# Patient Record
Sex: FEMALE | Race: WHITE
[De-identification: names, ages, dates, MRNs, and addresses within clinical notes are randomized per-mention and may not be internally consistent; named-entity substitution may affect disease eponyms.]

---

## 2019-03-02 ENCOUNTER — HOSPITAL ENCOUNTER (EMERGENCY)
Dept: HOSPITAL 82 - ED | Age: 24
Discharge: HOME | DRG: 563 | End: 2019-03-02
Payer: COMMERCIAL

## 2019-03-02 VITALS — HEIGHT: 66 IN | BODY MASS INDEX: 47.09 KG/M2 | WEIGHT: 293 LBS

## 2019-03-02 VITALS — SYSTOLIC BLOOD PRESSURE: 117 MMHG | DIASTOLIC BLOOD PRESSURE: 57 MMHG
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
## 2019-03-02 DIAGNOSIS — S42.295A: Primary | ICD-10-CM
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
## 2019-07-12 ENCOUNTER — HOSPITAL ENCOUNTER (EMERGENCY)
Dept: HOSPITAL 82 - ED | Age: 24
Discharge: ANOTHER HOSPITAL | DRG: 690 | End: 2019-07-12
Payer: COMMERCIAL

## 2019-07-12 VITALS — WEIGHT: 293 LBS | BODY MASS INDEX: 47.09 KG/M2 | HEIGHT: 66 IN

## 2019-07-12 VITALS — SYSTOLIC BLOOD PRESSURE: 116 MMHG | DIASTOLIC BLOOD PRESSURE: 77 MMHG

## 2019-07-12 DIAGNOSIS — E66.01: ICD-10-CM

## 2019-07-12 DIAGNOSIS — N39.0: Primary | ICD-10-CM

## 2019-07-12 LAB
ALBUMIN SERPL-MCNC: 3.8 G/DL (ref 3.2–5)
ALP SERPL-CCNC: 124 U/L (ref 38–126)
AMYLASE SERPL-CCNC: 34 U/L (ref 30–110)
ANION GAP SERPL CALCULATED.3IONS-SCNC: 16 MMOL/L
AST SERPL-CCNC: 91 U/L (ref 14–36)
BACTERIA #/AREA URNS HPF: (no result) HPF
BARBITURATES UR-MCNC: NEGATIVE UG/ML
BASOPHILS NFR BLD AUTO: 0 % (ref 0–3)
BILIRUB UR QL STRIP.AUTO: NEGATIVE
BUN SERPL-MCNC: 9 MG/DL (ref 7–17)
BUN/CREAT SERPL: 12
CHLORIDE SERPL-SCNC: 106 MMOL/L (ref 95–108)
CO2 SERPL-SCNC: 23 MMOL/L (ref 22–30)
COCAINE UR-MCNC: NEGATIVE NG/ML
COLOR UR AUTO: YELLOW
CREAT SERPL-MCNC: 0.7 MG/DL (ref 0.5–1)
EOSINOPHIL NFR BLD AUTO: 0 % (ref 0–8)
EPI CELLS URNS QL MICRO: (no result) EPI/HPF
ERYTHROCYTE [DISTWIDTH] IN BLOOD BY AUTOMATED COUNT: 18.4 % (ref 11.5–15.5)
GLUCOSE UR STRIP.AUTO-MCNC: NEGATIVE MG/DL
HCT VFR BLD AUTO: 34 % (ref 37–47)
HGB BLD-MCNC: 10.2 G/DL (ref 12–16)
HGB UR QL STRIP.AUTO: (no result)
IMM GRANULOCYTES NFR BLD: 1 % (ref 0–5)
KETONES UR STRIP.AUTO-MCNC: NEGATIVE MG/DL
LEUKOCYTE ESTERASE UR QL STRIP.AUTO: (no result)
LIPASE SERPL-CCNC: 59 U/L (ref 23–300)
LYMPHOCYTES NFR BLD: 12 % (ref 15–41)
MCH RBC QN AUTO: 20.1 PG  CALC (ref 26–32)
MCHC RBC AUTO-ENTMCNC: 30 G/L CALC (ref 32–36)
MCV RBC AUTO: 66.9 FL  CALC (ref 80–100)
METHADONE SERPL-MCNC: NEGATIVE NG/ML
MONOCYTES NFR BLD AUTO: 9 % (ref 2–13)
NEUTROPHILS # BLD AUTO: 14.76 THOU/UL (ref 2–7.15)
NEUTROPHILS NFR BLD AUTO: 78 % (ref 42–76)
NITRITE UR QL STRIP.AUTO: POSITIVE
OXCYCODONE: NEGATIVE
PH UR STRIP.AUTO: 5.5 [PH] (ref 4.5–8)
PLATELET # BLD AUTO: 393 THOU/UL (ref 130–400)
POTASSIUM SERPL-SCNC: 4.2 MMOL/L (ref 3.5–5.1)
PROT SERPL-MCNC: 8 G/DL (ref 6.3–8.2)
PROT UR QL STRIP.AUTO: 30 MG/DL
RBC # BLD AUTO: 5.08 MILL/UL (ref 4.2–5.6)
RBC #/AREA URNS HPF: (no result) RBC/HPF (ref 0–5)
SERVICE CMNT 15-IMP: (no result) HPF
SODIUM SERPL-SCNC: 141 MMOL/L (ref 137–146)
SP GR UR STRIP.AUTO: 1.02
TETRAHYDROCANNABIONOL: NEGATIVE
TRICYLIC ANTIDEPRESSANTS: NEGATIVE
UROBILINOGEN UR QL STRIP.AUTO: 0.2 E.U./DL
WBC #/AREA URNS HPF: (no result) WBC/HPF (ref 0–5)

## 2020-01-22 ENCOUNTER — HOSPITAL ENCOUNTER (EMERGENCY)
Age: 25
Discharge: ANOTHER HOSPITAL | DRG: 392 | End: 2020-01-22
Payer: COMMERCIAL

## 2020-01-22 DIAGNOSIS — R10.31: Primary | ICD-10-CM

## 2020-01-22 DIAGNOSIS — Z87.440: ICD-10-CM

## 2020-01-22 DIAGNOSIS — E66.9: ICD-10-CM

## 2020-01-22 DIAGNOSIS — F17.210: ICD-10-CM

## 2020-01-22 LAB
ALBUMIN SERPL-MCNC: 4.4 G/DL (ref 3.2–5)
ALP SERPL-CCNC: 69 U/L (ref 38–126)
AMYLASE SERPL-CCNC: 33 U/L (ref 30–110)
ANION GAP SERPL CALCULATED.3IONS-SCNC: 18 MMOL/L
AST SERPL-CCNC: 30 U/L (ref 14–36)
BASOPHILS NFR BLD AUTO: 0 % (ref 0–3)
BILIRUB UR QL STRIP.AUTO: NEGATIVE
BUN SERPL-MCNC: 14 MG/DL (ref 7–17)
BUN/CREAT SERPL: 17
CHLORIDE SERPL-SCNC: 105 MMOL/L (ref 95–108)
CO2 SERPL-SCNC: 21 MMOL/L (ref 22–30)
COLOR UR AUTO: YELLOW
CREAT SERPL-MCNC: 0.9 MG/DL (ref 0.5–1)
EOSINOPHIL NFR BLD AUTO: 1 % (ref 0–8)
ERYTHROCYTE [DISTWIDTH] IN BLOOD BY AUTOMATED COUNT: 15.4 % (ref 11.5–15.5)
GLUCOSE UR STRIP.AUTO-MCNC: NEGATIVE MG/DL
HCT VFR BLD AUTO: 38.7 % (ref 37–47)
HGB BLD-MCNC: 11.7 G/DL (ref 12–16)
HGB UR QL STRIP.AUTO: (no result)
IMM GRANULOCYTES NFR BLD: 0.6 % (ref 0–5)
KETONES UR STRIP.AUTO-MCNC: NEGATIVE MG/DL
LEUKOCYTE ESTERASE UR QL STRIP.AUTO: (no result)
LIPASE SERPL-CCNC: 82 U/L (ref 23–300)
LYMPHOCYTES NFR BLD: 16 % (ref 15–41)
MCH RBC QN AUTO: 22.6 PG  CALC (ref 26–32)
MCHC RBC AUTO-ENTMCNC: 30.2 G/L CALC (ref 32–36)
MCV RBC AUTO: 74.7 FL  CALC (ref 80–100)
MONOCYTES NFR BLD AUTO: 6 % (ref 2–13)
NEUTROPHILS # BLD AUTO: 8.18 THOU/UL (ref 2–7.15)
NEUTROPHILS NFR BLD AUTO: 77 % (ref 42–76)
NITRITE UR QL STRIP.AUTO: NEGATIVE
PH UR STRIP.AUTO: 5 [PH] (ref 4.5–8)
PLATELET # BLD AUTO: 321 THOU/UL (ref 130–400)
POTASSIUM SERPL-SCNC: 4.1 MMOL/L (ref 3.5–5.1)
PROT SERPL-MCNC: 8.7 G/DL (ref 6.3–8.2)
PROT UR QL STRIP.AUTO: NEGATIVE MG/DL
RBC # BLD AUTO: 5.18 MILL/UL (ref 4.2–5.6)
SODIUM SERPL-SCNC: 140 MMOL/L (ref 137–146)
SP GR UR STRIP.AUTO: 1.02
UROBILINOGEN UR QL STRIP.AUTO: 0.2 E.U./DL

## 2020-07-23 ENCOUNTER — HOSPITAL ENCOUNTER (EMERGENCY)
Dept: HOSPITAL 82 - ED | Age: 25
Discharge: HOME | DRG: 313 | End: 2020-07-23
Payer: COMMERCIAL

## 2020-07-23 VITALS — DIASTOLIC BLOOD PRESSURE: 71 MMHG | SYSTOLIC BLOOD PRESSURE: 125 MMHG

## 2020-07-23 VITALS — WEIGHT: 293 LBS | BODY MASS INDEX: 45.99 KG/M2 | HEIGHT: 67 IN

## 2020-07-23 DIAGNOSIS — R07.89: Primary | ICD-10-CM

## 2020-07-23 DIAGNOSIS — F15.90: ICD-10-CM

## 2020-07-23 DIAGNOSIS — F41.9: ICD-10-CM

## 2020-07-23 DIAGNOSIS — E66.01: ICD-10-CM

## 2020-07-23 DIAGNOSIS — F17.200: ICD-10-CM

## 2020-07-23 LAB
ALBUMIN SERPL-MCNC: 4.3 G/DL (ref 3.2–5)
ALP SERPL-CCNC: 87 U/L (ref 38–126)
AMYLASE SERPL-CCNC: 69 U/L (ref 30–110)
ANION GAP SERPL CALCULATED.3IONS-SCNC: 11 MMOL/L
AST SERPL-CCNC: 120 U/L (ref 14–36)
BACTERIA #/AREA URNS HPF: (no result) HPF
BASOPHILS NFR BLD AUTO: 0 % (ref 0–3)
BILIRUB UR QL STRIP.AUTO: NEGATIVE
BUN SERPL-MCNC: 17 MG/DL (ref 7–17)
BUN/CREAT SERPL: 18
CHLORIDE SERPL-SCNC: 105 MMOL/L (ref 95–108)
CO2 SERPL-SCNC: 23 MMOL/L (ref 22–30)
COLOR UR AUTO: (no result)
CREAT SERPL-MCNC: 0.9 MG/DL (ref 0.5–1)
EOSINOPHIL NFR BLD AUTO: 1 % (ref 0–8)
EPI CELLS URNS QL MICRO: (no result) EPI/HPF
ERYTHROCYTE [DISTWIDTH] IN BLOOD BY AUTOMATED COUNT: 16.7 % (ref 11.5–15.5)
GLUCOSE UR STRIP.AUTO-MCNC: NEGATIVE MG/DL
HCT VFR BLD AUTO: 36.4 % (ref 37–47)
HGB BLD-MCNC: 11.1 G/DL (ref 12–16)
HGB UR QL STRIP.AUTO: (no result)
IMM GRANULOCYTES NFR BLD: 0.5 % (ref 0–5)
KETONES UR STRIP.AUTO-MCNC: (no result) MG/DL
LEUKOCYTE ESTERASE UR QL STRIP.AUTO: NEGATIVE
LIPASE SERPL-CCNC: 173 U/L (ref 23–300)
LYMPHOCYTES NFR BLD: 26 % (ref 15–41)
MCH RBC QN AUTO: 22.4 PG  CALC (ref 26–32)
MCHC RBC AUTO-ENTMCNC: 30.5 G/DL CAL (ref 32–36)
MCV RBC AUTO: 73.5 FL  CALC (ref 80–100)
MONOCYTES NFR BLD AUTO: 7 % (ref 2–13)
NEUTROPHILS # BLD AUTO: 5.56 THOU/UL (ref 2–7.15)
NEUTROPHILS NFR BLD AUTO: 64 % (ref 42–76)
NITRITE UR QL STRIP.AUTO: NEGATIVE
PH UR STRIP.AUTO: 5.5 [PH] (ref 4.5–8)
PLATELET # BLD AUTO: 280 THOU/UL (ref 130–400)
POTASSIUM SERPL-SCNC: 3.8 MMOL/L (ref 3.5–5.1)
PROT SERPL-MCNC: 8.3 G/DL (ref 6.3–8.2)
PROT UR QL STRIP.AUTO: 30 MG/DL
RBC # BLD AUTO: 4.95 MILL/UL (ref 4.2–5.6)
RBC #/AREA URNS HPF: >100 RBC/HPF (ref 0–5)
SODIUM SERPL-SCNC: 136 MMOL/L (ref 137–146)
SP GR UR STRIP.AUTO: >=1.03
SQUAMOUS URNS QL MICRO: (no result) EPI/HPF
UROBILINOGEN UR QL STRIP.AUTO: 0.2 E.U./DL
WBC #/AREA URNS HPF: (no result) WBC/HPF (ref 0–5)

## 2020-08-13 ENCOUNTER — HOSPITAL ENCOUNTER (EMERGENCY)
Dept: HOSPITAL 82 - ED | Age: 25
Discharge: HOME | DRG: 313 | End: 2020-08-13
Payer: COMMERCIAL

## 2020-08-13 VITALS — BODY MASS INDEX: 45.99 KG/M2 | HEIGHT: 67 IN | WEIGHT: 293 LBS

## 2020-08-13 VITALS — SYSTOLIC BLOOD PRESSURE: 118 MMHG | DIASTOLIC BLOOD PRESSURE: 58 MMHG

## 2020-08-13 DIAGNOSIS — F84.0: ICD-10-CM

## 2020-08-13 DIAGNOSIS — F41.9: ICD-10-CM

## 2020-08-13 DIAGNOSIS — R07.89: Primary | ICD-10-CM

## 2020-08-13 DIAGNOSIS — G47.30: ICD-10-CM

## 2020-08-13 DIAGNOSIS — F17.210: ICD-10-CM

## 2020-08-13 DIAGNOSIS — E66.9: ICD-10-CM

## 2020-08-13 LAB
ALBUMIN SERPL-MCNC: 4.4 G/DL (ref 3.2–5)
ALP SERPL-CCNC: 94 U/L (ref 38–126)
AMYLASE SERPL-CCNC: 59 U/L (ref 30–110)
ANION GAP SERPL CALCULATED.3IONS-SCNC: 12 MMOL/L
AST SERPL-CCNC: 67 U/L (ref 14–36)
BASOPHILS NFR BLD AUTO: 0 % (ref 0–3)
BUN SERPL-MCNC: 14 MG/DL (ref 7–17)
BUN/CREAT SERPL: 17
CHLORIDE SERPL-SCNC: 103 MMOL/L (ref 95–108)
CO2 SERPL-SCNC: 27 MMOL/L (ref 22–30)
CREAT SERPL-MCNC: 0.9 MG/DL (ref 0.5–1)
EOSINOPHIL NFR BLD AUTO: 1 % (ref 0–8)
ERYTHROCYTE [DISTWIDTH] IN BLOOD BY AUTOMATED COUNT: 17.5 % (ref 11.5–15.5)
HCT VFR BLD AUTO: 41 % (ref 37–47)
HGB BLD-MCNC: 12 G/DL (ref 12–16)
IMM GRANULOCYTES NFR BLD: 0.6 % (ref 0–5)
LIPASE SERPL-CCNC: 154 U/L (ref 23–300)
LYMPHOCYTES NFR BLD: 23 % (ref 15–41)
MCH RBC QN AUTO: 22.2 PG  CALC (ref 26–32)
MCHC RBC AUTO-ENTMCNC: 29.3 G/DL CAL (ref 32–36)
MCV RBC AUTO: 75.9 FL  CALC (ref 80–100)
MONOCYTES NFR BLD AUTO: 6 % (ref 2–13)
MYOGLOBIN SERPL-MCNC: 19 NG/ML (ref 0–62)
NEUTROPHILS # BLD AUTO: 6.17 THOU/UL (ref 2–7.15)
NEUTROPHILS NFR BLD AUTO: 69 % (ref 42–76)
PLATELET # BLD AUTO: 314 THOU/UL (ref 130–400)
POTASSIUM SERPL-SCNC: 3.8 MMOL/L (ref 3.5–5.1)
PROT SERPL-MCNC: 8.3 G/DL (ref 6.3–8.2)
RBC # BLD AUTO: 5.4 MILL/UL (ref 4.2–5.6)
SODIUM SERPL-SCNC: 138 MMOL/L (ref 137–146)

## 2020-09-26 ENCOUNTER — HOSPITAL ENCOUNTER (EMERGENCY)
Dept: HOSPITAL 82 - ED | Age: 25
LOS: 1 days | Discharge: HOME | DRG: 103 | End: 2020-09-27
Payer: COMMERCIAL

## 2020-09-26 VITALS — WEIGHT: 293 LBS | HEIGHT: 67 IN | BODY MASS INDEX: 45.99 KG/M2

## 2020-09-26 DIAGNOSIS — M79.671: ICD-10-CM

## 2020-09-26 DIAGNOSIS — F17.200: ICD-10-CM

## 2020-09-26 DIAGNOSIS — R51: Primary | ICD-10-CM

## 2020-09-26 DIAGNOSIS — F84.0: ICD-10-CM

## 2020-09-26 DIAGNOSIS — E66.9: ICD-10-CM

## 2020-09-27 VITALS — SYSTOLIC BLOOD PRESSURE: 134 MMHG | DIASTOLIC BLOOD PRESSURE: 68 MMHG

## 2020-09-27 LAB
ALBUMIN SERPL-MCNC: 4.4 G/DL (ref 3.2–5)
ALP SERPL-CCNC: 72 U/L (ref 38–126)
ANION GAP SERPL CALCULATED.3IONS-SCNC: 14 MMOL/L
AST SERPL-CCNC: 29 U/L (ref 14–36)
BASOPHILS NFR BLD AUTO: 0 % (ref 0–3)
BUN SERPL-MCNC: 16 MG/DL (ref 7–17)
BUN/CREAT SERPL: 23
CHLORIDE SERPL-SCNC: 107 MMOL/L (ref 95–108)
CO2 SERPL-SCNC: 22 MMOL/L (ref 22–30)
CREAT SERPL-MCNC: 0.7 MG/DL (ref 0.5–1)
EOSINOPHIL NFR BLD AUTO: 1 % (ref 0–8)
ERYTHROCYTE [DISTWIDTH] IN BLOOD BY AUTOMATED COUNT: 15.8 % (ref 11.5–15.5)
HCT VFR BLD AUTO: 35.9 % (ref 37–47)
HGB BLD-MCNC: 11.4 G/DL (ref 12–16)
IMM GRANULOCYTES NFR BLD: 0.7 % (ref 0–5)
LYMPHOCYTES NFR BLD: 32 % (ref 15–41)
MCH RBC QN AUTO: 23.6 PG  CALC (ref 26–32)
MCHC RBC AUTO-ENTMCNC: 31.8 G/DL CAL (ref 32–36)
MCV RBC AUTO: 74.2 FL  CALC (ref 80–100)
MONOCYTES NFR BLD AUTO: 7 % (ref 2–13)
NEUTROPHILS # BLD AUTO: 5.52 THOU/UL (ref 2–7.15)
NEUTROPHILS NFR BLD AUTO: 59 % (ref 42–76)
PLATELET # BLD AUTO: 322 THOU/UL (ref 130–400)
POTASSIUM SERPL-SCNC: 3.5 MMOL/L (ref 3.5–5.1)
PROT SERPL-MCNC: 8.3 G/DL (ref 6.3–8.2)
RBC # BLD AUTO: 4.84 MILL/UL (ref 4.2–5.6)
SODIUM SERPL-SCNC: 140 MMOL/L (ref 137–146)

## 2020-11-16 ENCOUNTER — HOSPITAL ENCOUNTER (EMERGENCY)
Dept: HOSPITAL 82 - ED | Age: 25
Discharge: HOME | DRG: 880 | End: 2020-11-16
Payer: COMMERCIAL

## 2020-11-16 VITALS — BODY MASS INDEX: 45.99 KG/M2 | WEIGHT: 293 LBS | HEIGHT: 67 IN

## 2020-11-16 VITALS — SYSTOLIC BLOOD PRESSURE: 126 MMHG | DIASTOLIC BLOOD PRESSURE: 83 MMHG

## 2020-11-16 DIAGNOSIS — F41.0: Primary | ICD-10-CM

## 2020-11-16 DIAGNOSIS — E66.9: ICD-10-CM

## 2020-11-16 DIAGNOSIS — N39.0: ICD-10-CM

## 2020-11-16 DIAGNOSIS — F31.9: ICD-10-CM

## 2020-11-16 DIAGNOSIS — Z87.440: ICD-10-CM

## 2020-11-16 LAB
ALBUMIN SERPL-MCNC: 4.4 G/DL (ref 3.2–5)
ALP SERPL-CCNC: 72 U/L (ref 38–126)
AMORPH SED URNS QL MICRO: (no result) HPF
ANION GAP SERPL CALCULATED.3IONS-SCNC: 14 MMOL/L
AST SERPL-CCNC: 35 U/L (ref 14–36)
BASOPHILS NFR BLD AUTO: 0 % (ref 0–3)
BILIRUB UR QL STRIP.AUTO: NEGATIVE
BUN SERPL-MCNC: 18 MG/DL (ref 7–17)
BUN/CREAT SERPL: 21
CAOX CRY URNS QL MICRO: (no result) LPF
CHLORIDE SERPL-SCNC: 112 MMOL/L (ref 95–108)
CLARITY UR: (no result)
CO2 SERPL-SCNC: 19 MMOL/L (ref 22–30)
COLOR UR AUTO: YELLOW
CREAT SERPL-MCNC: 0.8 MG/DL (ref 0.5–1)
EOSINOPHIL NFR BLD AUTO: 1 % (ref 0–8)
ERYTHROCYTE [DISTWIDTH] IN BLOOD BY AUTOMATED COUNT: 16.1 % (ref 11.5–15.5)
GLUCOSE UR STRIP.AUTO-MCNC: NEGATIVE MG/DL
HCT VFR BLD AUTO: 43.3 % (ref 37–47)
HGB BLD-MCNC: 12.7 G/DL (ref 12–16)
HGB UR QL STRIP.AUTO: (no result)
IMM GRANULOCYTES NFR BLD: 0.5 % (ref 0–5)
KETONES UR STRIP.AUTO-MCNC: NEGATIVE MG/DL
LEUKOCYTE ESTERASE UR QL STRIP.AUTO: (no result)
LIPASE SERPL-CCNC: 107 U/L (ref 23–300)
LYMPHOCYTES NFR BLD: 28 % (ref 15–41)
MCH RBC QN AUTO: 24.2 PG  CALC (ref 26–32)
MCHC RBC AUTO-ENTMCNC: 29.3 G/DL CAL (ref 32–36)
MCV RBC AUTO: 82.5 FL  CALC (ref 80–100)
MONOCYTES NFR BLD AUTO: 7 % (ref 2–13)
NEUTROPHILS # BLD AUTO: 5.92 THOU/UL (ref 2–7.15)
NEUTROPHILS NFR BLD AUTO: 63 % (ref 42–76)
NITRITE UR QL STRIP.AUTO: NEGATIVE
PH UR STRIP.AUTO: 5.5 [PH] (ref 4.5–8)
PLATELET # BLD AUTO: 286 THOU/UL (ref 130–400)
POTASSIUM SERPL-SCNC: 4 MMOL/L (ref 3.5–5.1)
PROT SERPL-MCNC: 8.6 G/DL (ref 6.3–8.2)
PROT UR STRIP.AUTO-MCNC: NEGATIVE MG/DL
RBC # BLD AUTO: 5.25 MILL/UL (ref 4.2–5.6)
RBC #/AREA URNS HPF: (no result) RBC/HPF (ref 0–5)
SODIUM SERPL-SCNC: 140 MMOL/L (ref 137–146)
SP GR UR STRIP.AUTO: >=1.03
SQUAMOUS URNS QL MICRO: (no result) EPI/HPF
UROBILINOGEN UR QL STRIP.AUTO: 0.2 E.U./DL
WBC #/AREA URNS HPF: (no result) WBC/HPF (ref 0–5)

## 2020-12-29 ENCOUNTER — HOSPITAL ENCOUNTER (INPATIENT)
Dept: HOSPITAL 82 - ED | Age: 25
LOS: 2 days | Discharge: HOME | DRG: 918 | End: 2020-12-31
Attending: INTERNAL MEDICINE | Admitting: INTERNAL MEDICINE
Payer: COMMERCIAL

## 2020-12-29 VITALS — DIASTOLIC BLOOD PRESSURE: 76 MMHG | SYSTOLIC BLOOD PRESSURE: 149 MMHG

## 2020-12-29 VITALS — SYSTOLIC BLOOD PRESSURE: 160 MMHG | DIASTOLIC BLOOD PRESSURE: 82 MMHG

## 2020-12-29 VITALS — DIASTOLIC BLOOD PRESSURE: 73 MMHG | SYSTOLIC BLOOD PRESSURE: 164 MMHG

## 2020-12-29 VITALS — DIASTOLIC BLOOD PRESSURE: 71 MMHG | SYSTOLIC BLOOD PRESSURE: 155 MMHG

## 2020-12-29 VITALS — SYSTOLIC BLOOD PRESSURE: 134 MMHG | DIASTOLIC BLOOD PRESSURE: 64 MMHG

## 2020-12-29 VITALS — DIASTOLIC BLOOD PRESSURE: 59 MMHG | SYSTOLIC BLOOD PRESSURE: 139 MMHG

## 2020-12-29 VITALS — SYSTOLIC BLOOD PRESSURE: 148 MMHG | DIASTOLIC BLOOD PRESSURE: 55 MMHG

## 2020-12-29 VITALS — DIASTOLIC BLOOD PRESSURE: 86 MMHG | SYSTOLIC BLOOD PRESSURE: 159 MMHG

## 2020-12-29 VITALS — DIASTOLIC BLOOD PRESSURE: 91 MMHG | SYSTOLIC BLOOD PRESSURE: 149 MMHG

## 2020-12-29 VITALS — HEIGHT: 67 IN | WEIGHT: 293 LBS | BODY MASS INDEX: 45.99 KG/M2

## 2020-12-29 DIAGNOSIS — E66.01: ICD-10-CM

## 2020-12-29 DIAGNOSIS — F17.200: ICD-10-CM

## 2020-12-29 DIAGNOSIS — G47.30: ICD-10-CM

## 2020-12-29 DIAGNOSIS — F15.129: ICD-10-CM

## 2020-12-29 DIAGNOSIS — T43.621A: Primary | ICD-10-CM

## 2020-12-29 DIAGNOSIS — R00.0: ICD-10-CM

## 2020-12-29 DIAGNOSIS — F31.9: ICD-10-CM

## 2020-12-29 DIAGNOSIS — Z20.828: ICD-10-CM

## 2020-12-29 DIAGNOSIS — Z78.1: ICD-10-CM

## 2020-12-29 DIAGNOSIS — R44.1: ICD-10-CM

## 2020-12-29 DIAGNOSIS — F84.0: ICD-10-CM

## 2020-12-29 DIAGNOSIS — N30.20: ICD-10-CM

## 2020-12-29 DIAGNOSIS — Z87.442: ICD-10-CM

## 2020-12-29 DIAGNOSIS — Z23: ICD-10-CM

## 2020-12-29 DIAGNOSIS — E86.0: ICD-10-CM

## 2020-12-29 DIAGNOSIS — R44.0: ICD-10-CM

## 2020-12-29 DIAGNOSIS — I95.9: ICD-10-CM

## 2020-12-29 DIAGNOSIS — F41.9: ICD-10-CM

## 2020-12-29 LAB
ALBUMIN SERPL-MCNC: 4.8 G/DL (ref 3.2–5)
ALP SERPL-CCNC: 78 U/L (ref 38–126)
ANION GAP SERPL CALCULATED.3IONS-SCNC: 17 MMOL/L
APTT PPP: 23.3 SECONDS (ref 20–32.5)
AST SERPL-CCNC: 24 U/L (ref 14–36)
BACTERIA #/AREA URNS HPF: (no result) HPF
BASOPHILS NFR BLD AUTO: 0 % (ref 0–3)
BILIRUB UR QL STRIP.AUTO: (no result)
BUN SERPL-MCNC: 19 MG/DL (ref 7–17)
BUN/CREAT SERPL: 23
CHLORIDE SERPL-SCNC: 106 MMOL/L (ref 95–108)
CO2 SERPL-SCNC: 24 MMOL/L (ref 22–30)
COLOR UR AUTO: YELLOW
CREAT SERPL-MCNC: 0.9 MG/DL (ref 0.5–1)
EOSINOPHIL NFR BLD AUTO: 1 % (ref 0–8)
EPI CELLS URNS QL MICRO: (no result) EPI/HPF
ERYTHROCYTE [DISTWIDTH] IN BLOOD BY AUTOMATED COUNT: 14.8 % (ref 11.5–15.5)
ETHANOL SERPL-MCNC: 0 MG/DL (ref 0–30)
FINE GRAN CASTS URNS QL MICRO: (no result) LPF
GLUCOSE UR STRIP.AUTO-MCNC: NEGATIVE MG/DL
HCT VFR BLD AUTO: 41.6 % (ref 37–47)
HGB BLD-MCNC: 12.8 G/DL (ref 12–16)
HGB UR QL STRIP.AUTO: (no result)
IMM GRANULOCYTES NFR BLD: 1.1 % (ref 0–5)
INR PPP: 1.1 RATIO (ref 0.7–1.3)
KETONES UR STRIP.AUTO-MCNC: 15 MG/DL
LEUKOCYTE ESTERASE UR QL STRIP.AUTO: NEGATIVE
LIPASE SERPL-CCNC: 95 U/L (ref 23–300)
LYMPHOCYTES NFR BLD: 26 % (ref 15–41)
MCH RBC QN AUTO: 23.3 PG  CALC (ref 26–32)
MCHC RBC AUTO-ENTMCNC: 30.8 G/DL CAL (ref 32–36)
MCV RBC AUTO: 75.8 FL  CALC (ref 80–100)
MONOCYTES NFR BLD AUTO: 8 % (ref 2–13)
NEUTROPHILS # BLD AUTO: 8.62 THOU/UL (ref 2–7.15)
NEUTROPHILS NFR BLD AUTO: 63 % (ref 42–76)
NITRITE UR QL STRIP.AUTO: NEGATIVE
PH UR STRIP.AUTO: 5.5 [PH] (ref 4.5–8)
PLATELET # BLD AUTO: 384 THOU/UL (ref 130–400)
POTASSIUM SERPL-SCNC: 3.5 MMOL/L (ref 3.5–5.1)
PROT SERPL-MCNC: 9 G/DL (ref 6.3–8.2)
PROT UR QL STRIP.AUTO: 30 MG/DL
PROTHROMBIN TIME: 11.3 SECONDS (ref 9–12.5)
RBC # BLD AUTO: 5.49 MILL/UL (ref 4.2–5.6)
SERVICE CMNT 15-IMP: (no result) HPF
SODIUM SERPL-SCNC: 143 MMOL/L (ref 137–146)
SP GR UR STRIP.AUTO: >=1.03
UROBILINOGEN UR QL STRIP.AUTO: 0.2 E.U./DL
WBC #/AREA URNS HPF: (no result) WBC/HPF (ref 0–5)

## 2020-12-29 PROCEDURE — S0166 INJ OLANZAPINE 2.5MG: HCPCS

## 2020-12-29 NOTE — NUR
25 yr old female admitted icu5 per stretcher from er. amb self & 1 assist to
bed. bed weight obtained. oriented to room. ivf began as ordered.

## 2020-12-29 NOTE — NUR
Admission Note
 
Report Given to:         MAYA LAM
Transported by:           Wheelchair          X Stretcher
 
Transported with:        X Nurse     Transporter   X Patent IV    O2
                         X Cardiac Monitor
 
Location:                X ICU       MS2

## 2020-12-29 NOTE — NUR
PT IN ROOM TALKING TO HERSELF AND LAUGHING. NO ACTIVE DISTRESS NOTED.
TACHYCARDIC ON MONITOR. CALL LIGHT IN REACH. BED ALARM IN PLACE. CONTINUE TO
MONITOR.

## 2020-12-29 NOTE — NUR
PT SLEEPING IN BED, O2 VIA NC IN PLACE. NO DISTRESS NOTED. PT SINUS TACH ON
MONITOR 108. CONTINUE TO MONITOR

## 2020-12-29 NOTE — NUR
PT LAYING IN BED. ANXIOUS AT THIS TIME. PT ADMITS THE USE OF METH X1 USE,
STATES THIS IS HER FIRST TIME USING THE SUBTANCE. ASKED PT IF SHE WAS HEARING
VOICES OR HAVING HALLUCINATIONS, PER PT " I HAVE ANXIETY AND BIPOLAR I HEAR
THEM ALL THE TIME".  A&O TO SELF, REORIENTED PT TO PLACE AND TIME,
REINFORCEMENT NEEDED. PT DENIES ANY PAIN AT THIS TIME. CONTINUSOULY GRABBING
ABD. PT ASSISTED TO BSC, PT VOIDED 200 ML OF DARK BROWN URINE. PT REPORTS TO
BE ON HER "PERIOD". FEMININE PAD PROVIDED ALONG WITH [facility] WITH THE
ASSISTANCE OF MARYANNE TREJO. BM NOTED. REDDNESS AND IRRITATION/SCRATCHES NOTED
TO VAGINAL AREA. PT ASKED AND RESPONSE WAS THAT SHE "SHAVED AND USED MCKENZIE". NO
ACTIVE DRAINAGE NOTED.  DRYNESS AROUND LIPS NOTED. PT CURRENTLY SINUS TACH ON
MONITOR 110, O2 97%, RR 20, /58. #20 TO LAC HEALTHY AND INTACT WITH NS
INFUSING  ML/HR. BED ALARM PLACED FOR SAFETY. BED SET IN LOWEST
POSITION. SIDE RAILS PADDED FOR SEIZURE PERCAUTIONS. ASSESSMENT COMPLETED.
DISCUSSED POC, REINFORCEMENT NEEDED. CALL LIGHT IN REACH. CONTINUE TO MONITOR.

## 2020-12-29 NOTE — NUR
PT HALLUCINATING TALKING TO "". STATES SHE SEES "MULTIPLE PEOPLE OUT
BY 3 THE WINDOW", THAT ARE NOW "HANGING OFF THE TREE". PT UNSTABLE AND WEAK.
SECURITY AT BEDSIDE.

## 2020-12-29 NOTE — NUR
PT HALLUCINATING AND WANTING TO "LEAVE". PT BEGINNING TO BECOME AGRESSIVE
TOWARDS THIS WRITER AND MARYANNE RN. PT UP OUT OF BED ATTEMPTING TO GO INTO
BED 4'S ROOM. ATTEMPTED TO CALM PT DOWN, UNSUCCESSFUL.

## 2020-12-29 NOTE — NUR
PT PULLING ESSENTIAL EQUIPMENT AND COMBATIVE. PT COMBATIVE. ORDER REC FOR
BILATERAL SOFT WRIST RESTRAINTS BY ELLIS SAXENA.

## 2020-12-29 NOTE — NUR
eyes closed. nad. hob remains elevated. cardiac monitor shows sinus tach hr
104. #24 rt hand. ns infusing @ 125cchr. bilat wrist restraints & fall
precautions cont.

## 2020-12-29 NOTE — NUR
PT RELEASED HERSELF FROM RESTRAINTS, STOOD UP AND URINATED ALL OVER THE FLOOR
AND ON THIS WRITER.PT REDIRECTED TO BED, [facility] PROVIDED BY THIS WRITER
AND HILDA YU RN, MONITOR PLACED BACK ON PATIENT. SOFT BILATERAL RESTRAINTS
PLACED BACK ON. BED ALARM IN PLACE, CONTINUE TO MONITOR.

## 2020-12-30 VITALS — DIASTOLIC BLOOD PRESSURE: 74 MMHG | SYSTOLIC BLOOD PRESSURE: 129 MMHG

## 2020-12-30 VITALS — SYSTOLIC BLOOD PRESSURE: 154 MMHG | DIASTOLIC BLOOD PRESSURE: 84 MMHG

## 2020-12-30 VITALS — SYSTOLIC BLOOD PRESSURE: 155 MMHG | DIASTOLIC BLOOD PRESSURE: 70 MMHG

## 2020-12-30 VITALS — DIASTOLIC BLOOD PRESSURE: 87 MMHG | SYSTOLIC BLOOD PRESSURE: 128 MMHG

## 2020-12-30 VITALS — SYSTOLIC BLOOD PRESSURE: 163 MMHG | DIASTOLIC BLOOD PRESSURE: 76 MMHG

## 2020-12-30 VITALS — SYSTOLIC BLOOD PRESSURE: 132 MMHG | DIASTOLIC BLOOD PRESSURE: 68 MMHG

## 2020-12-30 VITALS — SYSTOLIC BLOOD PRESSURE: 137 MMHG | DIASTOLIC BLOOD PRESSURE: 76 MMHG

## 2020-12-30 VITALS — SYSTOLIC BLOOD PRESSURE: 118 MMHG | DIASTOLIC BLOOD PRESSURE: 63 MMHG

## 2020-12-30 VITALS — SYSTOLIC BLOOD PRESSURE: 166 MMHG | DIASTOLIC BLOOD PRESSURE: 71 MMHG

## 2020-12-30 VITALS — DIASTOLIC BLOOD PRESSURE: 49 MMHG | SYSTOLIC BLOOD PRESSURE: 117 MMHG

## 2020-12-30 LAB
ANION GAP SERPL CALCULATED.3IONS-SCNC: 15 MMOL/L
BUN SERPL-MCNC: 9 MG/DL (ref 7–17)
BUN/CREAT SERPL: 13
CHLORIDE SERPL-SCNC: 111 MMOL/L (ref 95–108)
CK SERPL-CCNC: 129 U/L (ref 30–165)
CO2 SERPL-SCNC: 21 MMOL/L (ref 22–30)
CREAT SERPL-MCNC: 0.7 MG/DL (ref 0.5–1)
ERYTHROCYTE [DISTWIDTH] IN BLOOD BY AUTOMATED COUNT: 14.7 % (ref 11.5–15.5)
HCT VFR BLD AUTO: 39.3 % (ref 37–47)
HGB BLD-MCNC: 12.1 G/DL (ref 12–16)
MAGNESIUM SERPL-MCNC: 1.9 MG/DL (ref 1.6–2.3)
MCH RBC QN AUTO: 23.8 PG  CALC (ref 26–32)
MCHC RBC AUTO-ENTMCNC: 30.8 G/DL CAL (ref 32–36)
MCV RBC AUTO: 77.2 FL  CALC (ref 80–100)
PLATELET # BLD AUTO: 313 THOU/UL (ref 130–400)
POTASSIUM SERPL-SCNC: 4.1 MMOL/L (ref 3.5–5.1)
RBC # BLD AUTO: 5.09 MILL/UL (ref 4.2–5.6)
SODIUM SERPL-SCNC: 143 MMOL/L (ref 137–146)

## 2020-12-30 NOTE — NUR
PT WITH SOME STOOL INCONTINENCE, SAYS THAT WHEN SHE DOES METH SHE DOES NOT EAT
AND WHEN SHE STARTS EATING AGAIN SHE HAS WATERY STOOLS.

## 2020-12-30 NOTE — NUR
PT HAS BEEN AMBULATORY IN ROOM TO BR, SEEMS TO HAVE LIQUID STOOLS WITH
RESIDUAL EVIDENCE ON GOWNS, LINEN, TOWELS.  PT IS MORE COMMUNICATIVE THAN
EARLIER IN SHIFT, ASKED ABOUT WHEN SHE WOULD GO HOME.

## 2020-12-30 NOTE — NUR
PT. SLEEPING AND AWAKENED FOR ASSESSMENT AND VS; COMPLETED AT THIS TIME.
DENIES NEEDS/PAIN. PT.DECLINES DINNER TRAY AND GATORADE PROVIDED AS PE
REQUEST. PT. RE-CONNECTED TO CARDIAC MONITOR AND VS MACHINE, ENCOURAGED PT. TO
LEAVE THIS ON AS SHE HAS REFUSED THROUGHOUT THE DAY. PT. UP TO THE CHAIR AND
LINENS CHANGED AS PER HER REQUEST. CARDIAC MONITOR READS STACH 110'S. B/P WNL.
SIDE RAILS PADDED. BSC PLACED AT BEDSIDE AND INSTRUCTED TO USE; ENCOURAGED TO
CALL FOR ANY NEEDS. CALL LIGHT IS IN REACH. WILL CONTINUE TO MONITOR.

## 2020-12-30 NOTE — NUR
PT ABLE TO BATHE HERSELF THIS MORNING, SHEETS CHANGED.  PT HEARD TALKING ON
PHONE WHILE IN ROOM.  SHE REMAINS IMPULSIVE, GETS UP QUICKLY TO BSC.  NO
DISTRESS, NO COMPLAINTS.

## 2020-12-30 NOTE — NUR
PT SEEN AWAKE, ALERT, ORIENTED X 1-2.  SHE APPEARS IMPULSIVE, JUMPY, BUT DOES
ANSWER QUESTIONS APPROPRIATELY.  PT SEEN BY DR NELSON, WILL KEEP AS PER
DEHYDRATION.  NO ACUTE DISTRESS, NO WEAKNESS.  GAIT STEADY.

## 2020-12-31 VITALS — SYSTOLIC BLOOD PRESSURE: 120 MMHG | DIASTOLIC BLOOD PRESSURE: 66 MMHG

## 2020-12-31 VITALS — SYSTOLIC BLOOD PRESSURE: 152 MMHG | DIASTOLIC BLOOD PRESSURE: 89 MMHG

## 2020-12-31 VITALS — SYSTOLIC BLOOD PRESSURE: 98 MMHG | DIASTOLIC BLOOD PRESSURE: 51 MMHG

## 2020-12-31 VITALS — SYSTOLIC BLOOD PRESSURE: 114 MMHG | DIASTOLIC BLOOD PRESSURE: 69 MMHG

## 2020-12-31 VITALS — DIASTOLIC BLOOD PRESSURE: 61 MMHG | SYSTOLIC BLOOD PRESSURE: 111 MMHG

## 2020-12-31 VITALS — DIASTOLIC BLOOD PRESSURE: 75 MMHG | SYSTOLIC BLOOD PRESSURE: 124 MMHG

## 2020-12-31 LAB
ALBUMIN SERPL-MCNC: 3.6 G/DL (ref 3.2–5)
ALP SERPL-CCNC: 58 U/L (ref 38–126)
ANION GAP SERPL CALCULATED.3IONS-SCNC: 12 MMOL/L
AST SERPL-CCNC: 25 U/L (ref 14–36)
BASOPHILS NFR BLD AUTO: 0 % (ref 0–3)
BUN SERPL-MCNC: 10 MG/DL (ref 7–17)
BUN/CREAT SERPL: 16
CHLORIDE SERPL-SCNC: 112 MMOL/L (ref 95–108)
CO2 SERPL-SCNC: 23 MMOL/L (ref 22–30)
CREAT SERPL-MCNC: 0.6 MG/DL (ref 0.5–1)
EOSINOPHIL NFR BLD AUTO: 1 % (ref 0–8)
ERYTHROCYTE [DISTWIDTH] IN BLOOD BY AUTOMATED COUNT: 15 % (ref 11.5–15.5)
HCT VFR BLD AUTO: 35.4 % (ref 37–47)
HGB BLD-MCNC: 10.7 G/DL (ref 12–16)
IMM GRANULOCYTES NFR BLD: 0.8 % (ref 0–5)
LYMPHOCYTES NFR BLD: 29 % (ref 15–41)
MCH RBC QN AUTO: 23.8 PG  CALC (ref 26–32)
MCHC RBC AUTO-ENTMCNC: 30.2 G/DL CAL (ref 32–36)
MCV RBC AUTO: 78.7 FL  CALC (ref 80–100)
MONOCYTES NFR BLD AUTO: 8 % (ref 2–13)
NEUTROPHILS # BLD AUTO: 4.64 THOU/UL (ref 2–7.15)
NEUTROPHILS NFR BLD AUTO: 61 % (ref 42–76)
PLATELET # BLD AUTO: 257 THOU/UL (ref 130–400)
POTASSIUM SERPL-SCNC: 3.9 MMOL/L (ref 3.5–5.1)
PROT SERPL-MCNC: 6.9 G/DL (ref 6.3–8.2)
RBC # BLD AUTO: 4.5 MILL/UL (ref 4.2–5.6)
SODIUM SERPL-SCNC: 142 MMOL/L (ref 137–146)

## 2020-12-31 NOTE — NUR
PT SEEN AWAKE AFTER PHLEBOTOMIST PA BLOOD, ALERT AND ORIENTED X 3.  LUNGS
CLEAR.  PT AMBULATORY IN ROOM, WAITS FOR DOCTOR IN HOPES OF DISCHARGE.

## 2020-12-31 NOTE — NUR
RESTING IN BED WITH NO DISTRESS NOTED; DENIES NEEDS/PAIN. TEMP 99; WILL
CONTINUE TO MONITOR. ENCOURAGED TO CALL FOR ANY NEEDS. CALL LIGHT IS IN REACH.

## 2020-12-31 NOTE — NUR
PT. RESTING IN BED AND DENIES NEEDS. PER PHLEBOTOMISTUMU, HE REPORTS PT. DOES
NOT WANT TO BE DRAWN AT THIS TIME FOR AM LABS AND WILL ALLOW THE NEXT SHIFT
AFTER SHE DRINKS WATER. PT. BROUGHT FRESH WATER AND ENCOURAGED TO DRINK. PT.
DECLINES TO GET UP FOR VOID AND REPORTS SHE WILL LATER. CHUCKS CHECKED AND PT.
IS DRY. ENCOURAGED TO CALL FOR ANY NEEDS. CALL LIGHT IS IN REACH.

## 2020-12-31 NOTE — NUR
PT. RESTINGIN BED WITH EYES CLOSED; NO DISTRESS NOTED; RESP. EVEN AND
UNLABORED. CARDIAC MONITOR IN PLACE AND REMAINS [id number]; VSS. WILL
CONTINUE TO MONITOR.

## 2020-12-31 NOTE — NUR
PT TAKEN TO MED SURG ROOM TO SHOWER, RETURNS FEELING BETTER.  PT WITH
UNCONTROLLED WATERY STOOLS, PROVIDED IMODIUM FOR SAME.  PT TAKEN TO HER
VEHICLE OUTSIDE TO RETRIEVE CLOTHES.  PT SAYS NOW THAT SHE WILL GO TO REHAB IN
[place].  DR NELSON AND DERICK MADE AWARE, CASE MGMT AWARE, ALSO.

## 2020-12-31 NOTE — NUR
DR NELSON IN ROOM, SPEAKS TO PT ABOUT POSSIBILITY OF DRUG REHAB PROGRAM.  PT
TO CONSIDER THIS VS DC HOME AND FOLLOW UP IN DOCTOR'S OFFICE.

## 2020-12-31 NOTE — NUR
PT TAKEN BY WHEELCHAIR TO OUTSIDE ER WHERE SHANNON PICKED HER UP TO TAKE HER TO
[facility] IN [place] AT OUR EXPENSE.  PT PROVIDED
DISCHARGE INSTRUCTIONS, VERBALIZES UNDERSTANDING OF SAME, LEAVES [facility] IN STABLE
CONDITION.

## 2021-01-03 ENCOUNTER — HOSPITAL ENCOUNTER (EMERGENCY)
Dept: HOSPITAL 82 - ED | Age: 26
Discharge: ANOTHER HOSPITAL | DRG: 880 | End: 2021-01-03
Payer: COMMERCIAL

## 2021-01-03 VITALS — BODY MASS INDEX: 45.99 KG/M2 | WEIGHT: 293 LBS | HEIGHT: 67 IN

## 2021-01-03 VITALS — SYSTOLIC BLOOD PRESSURE: 128 MMHG | DIASTOLIC BLOOD PRESSURE: 77 MMHG

## 2021-01-03 DIAGNOSIS — G47.30: ICD-10-CM

## 2021-01-03 DIAGNOSIS — F84.0: ICD-10-CM

## 2021-01-03 DIAGNOSIS — F31.9: ICD-10-CM

## 2021-01-03 DIAGNOSIS — F17.200: ICD-10-CM

## 2021-01-03 DIAGNOSIS — R45.851: Primary | ICD-10-CM

## 2021-01-03 DIAGNOSIS — E66.9: ICD-10-CM

## 2021-01-03 LAB
ALBUMIN SERPL-MCNC: 4.3 G/DL (ref 3.2–5)
ALP SERPL-CCNC: 59 U/L (ref 38–126)
ANION GAP SERPL CALCULATED.3IONS-SCNC: 14 MMOL/L
AST SERPL-CCNC: 22 U/L (ref 14–36)
BACTERIA #/AREA URNS HPF: (no result) HPF
BASOPHILS NFR BLD AUTO: 0 % (ref 0–3)
BILIRUB UR QL STRIP.AUTO: NEGATIVE
BUN SERPL-MCNC: 18 MG/DL (ref 7–17)
BUN/CREAT SERPL: 23
CAOX CRY URNS QL MICRO: (no result) LPF
CHLORIDE SERPL-SCNC: 107 MMOL/L (ref 95–108)
CO2 SERPL-SCNC: 23 MMOL/L (ref 22–30)
COLOR UR AUTO: YELLOW
CREAT SERPL-MCNC: 0.8 MG/DL (ref 0.5–1)
EOSINOPHIL NFR BLD AUTO: 2 % (ref 0–8)
ERYTHROCYTE [DISTWIDTH] IN BLOOD BY AUTOMATED COUNT: 14.6 % (ref 11.5–15.5)
ETHANOL SERPL-MCNC: 0 MG/DL (ref 0–30)
GLUCOSE UR STRIP.AUTO-MCNC: NEGATIVE MG/DL
HCT VFR BLD AUTO: 41.1 % (ref 37–47)
HGB BLD-MCNC: 12.5 G/DL (ref 12–16)
HGB UR QL STRIP.AUTO: (no result)
IMM GRANULOCYTES NFR BLD: 0.8 % (ref 0–5)
KETONES UR STRIP.AUTO-MCNC: NEGATIVE MG/DL
LEUKOCYTE ESTERASE UR QL STRIP.AUTO: NEGATIVE
LYMPHOCYTES NFR BLD: 27 % (ref 15–41)
MCH RBC QN AUTO: 23.4 PG  CALC (ref 26–32)
MCHC RBC AUTO-ENTMCNC: 30.4 G/DL CAL (ref 32–36)
MCV RBC AUTO: 76.8 FL  CALC (ref 80–100)
MONOCYTES NFR BLD AUTO: 7 % (ref 2–13)
NEUTROPHILS # BLD AUTO: 7.01 THOU/UL (ref 2–7.15)
NEUTROPHILS NFR BLD AUTO: 64 % (ref 42–76)
NITRITE UR QL STRIP.AUTO: NEGATIVE
PH UR STRIP.AUTO: 5.5 [PH] (ref 4.5–8)
PLATELET # BLD AUTO: 329 THOU/UL (ref 130–400)
POTASSIUM SERPL-SCNC: 4.1 MMOL/L (ref 3.5–5.1)
PROT SERPL-MCNC: 8.2 G/DL (ref 6.3–8.2)
PROT UR QL STRIP.AUTO: (no result) MG/DL
RBC # BLD AUTO: 5.35 MILL/UL (ref 4.2–5.6)
RBC #/AREA URNS HPF: (no result) RBC/HPF (ref 0–5)
SERVICE CMNT 15-IMP: (no result) HPF
SODIUM SERPL-SCNC: 140 MMOL/L (ref 137–146)
SP GR UR STRIP.AUTO: 1.02
SQUAMOUS URNS QL MICRO: (no result) EPI/HPF
UROBILINOGEN UR QL STRIP.AUTO: 0.2 E.U./DL
WBC #/AREA URNS HPF: (no result) WBC/HPF (ref 0–5)

## 2021-02-28 ENCOUNTER — HOSPITAL ENCOUNTER (EMERGENCY)
Dept: HOSPITAL 82 - ED | Age: 26
LOS: 1 days | Discharge: HOME | DRG: 392 | End: 2021-03-01
Payer: COMMERCIAL

## 2021-02-28 ENCOUNTER — HOSPITAL ENCOUNTER (EMERGENCY)
Dept: HOSPITAL 82 - ED | Age: 26
Discharge: HOME | DRG: 392 | End: 2021-02-28
Payer: COMMERCIAL

## 2021-02-28 VITALS — HEIGHT: 69 IN | WEIGHT: 293 LBS | BODY MASS INDEX: 43.4 KG/M2

## 2021-02-28 VITALS — BODY MASS INDEX: 43.4 KG/M2 | WEIGHT: 293 LBS | HEIGHT: 69 IN

## 2021-02-28 VITALS — SYSTOLIC BLOOD PRESSURE: 154 MMHG | DIASTOLIC BLOOD PRESSURE: 95 MMHG

## 2021-02-28 DIAGNOSIS — E66.9: ICD-10-CM

## 2021-02-28 DIAGNOSIS — Z87.442: ICD-10-CM

## 2021-02-28 DIAGNOSIS — Z91.128: ICD-10-CM

## 2021-02-28 DIAGNOSIS — F15.10: ICD-10-CM

## 2021-02-28 DIAGNOSIS — T43.506A: ICD-10-CM

## 2021-02-28 DIAGNOSIS — F84.0: ICD-10-CM

## 2021-02-28 DIAGNOSIS — F41.9: ICD-10-CM

## 2021-02-28 DIAGNOSIS — F16.10: ICD-10-CM

## 2021-02-28 DIAGNOSIS — K59.00: Primary | ICD-10-CM

## 2021-02-28 DIAGNOSIS — F31.9: ICD-10-CM

## 2021-02-28 DIAGNOSIS — F14.10: ICD-10-CM

## 2021-02-28 LAB
ALBUMIN SERPL-MCNC: 4.8 G/DL (ref 3.2–5)
ALBUMIN SERPL-MCNC: 4.9 G/DL (ref 3.2–5)
ALP SERPL-CCNC: 65 U/L (ref 38–126)
ALP SERPL-CCNC: 71 U/L (ref 38–126)
AMYLASE SERPL-CCNC: 37 U/L (ref 30–110)
AMYLASE SERPL-CCNC: 39 U/L (ref 30–110)
ANION GAP SERPL CALCULATED.3IONS-SCNC: 16 MMOL/L
ANION GAP SERPL CALCULATED.3IONS-SCNC: 17 MMOL/L
AST SERPL-CCNC: 29 U/L (ref 14–36)
AST SERPL-CCNC: 32 U/L (ref 14–36)
BASOPHILS NFR BLD AUTO: 0 % (ref 0–3)
BASOPHILS NFR BLD AUTO: 0 % (ref 0–3)
BUN SERPL-MCNC: 15 MG/DL (ref 7–17)
BUN SERPL-MCNC: 17 MG/DL (ref 7–17)
BUN/CREAT SERPL: 20
BUN/CREAT SERPL: 20
CHLORIDE SERPL-SCNC: 107 MMOL/L (ref 95–108)
CHLORIDE SERPL-SCNC: 111 MMOL/L (ref 95–108)
CO2 SERPL-SCNC: 21 MMOL/L (ref 22–30)
CO2 SERPL-SCNC: 22 MMOL/L (ref 22–30)
CREAT SERPL-MCNC: 0.7 MG/DL (ref 0.5–1)
CREAT SERPL-MCNC: 0.9 MG/DL (ref 0.5–1)
EOSINOPHIL NFR BLD AUTO: 0 % (ref 0–8)
EOSINOPHIL NFR BLD AUTO: 0 % (ref 0–8)
ERYTHROCYTE [DISTWIDTH] IN BLOOD BY AUTOMATED COUNT: 15.4 % (ref 11.5–15.5)
ERYTHROCYTE [DISTWIDTH] IN BLOOD BY AUTOMATED COUNT: 15.6 % (ref 11.5–15.5)
ETHANOL SERPL-MCNC: 0 MG/DL (ref 0–30)
HCT VFR BLD AUTO: 40.5 % (ref 37–47)
HCT VFR BLD AUTO: 40.8 % (ref 37–47)
HGB BLD-MCNC: 12.3 G/DL (ref 12–16)
HGB BLD-MCNC: 12.6 G/DL (ref 12–16)
IMM GRANULOCYTES NFR BLD: 0.5 % (ref 0–5)
IMM GRANULOCYTES NFR BLD: 0.8 % (ref 0–5)
LIPASE SERPL-CCNC: 84 U/L (ref 23–300)
LYMPHOCYTES NFR BLD: 18 % (ref 15–41)
LYMPHOCYTES NFR BLD: 22 % (ref 15–41)
MCH RBC QN AUTO: 23.1 PG  CALC (ref 26–32)
MCH RBC QN AUTO: 24 PG  CALC (ref 26–32)
MCHC RBC AUTO-ENTMCNC: 30.1 G/DL CAL (ref 32–36)
MCHC RBC AUTO-ENTMCNC: 31.1 G/DL CAL (ref 32–36)
MCV RBC AUTO: 76.5 FL  CALC (ref 80–100)
MCV RBC AUTO: 77.1 FL  CALC (ref 80–100)
MONOCYTES NFR BLD AUTO: 7 % (ref 2–13)
MONOCYTES NFR BLD AUTO: 7 % (ref 2–13)
NEUTROPHILS # BLD AUTO: 6.32 THOU/UL (ref 2–7.15)
NEUTROPHILS # BLD AUTO: 7.07 THOU/UL (ref 2–7.15)
NEUTROPHILS NFR BLD AUTO: 70 % (ref 42–76)
NEUTROPHILS NFR BLD AUTO: 74 % (ref 42–76)
PLATELET # BLD AUTO: 310 THOU/UL (ref 130–400)
PLATELET # BLD AUTO: 330 THOU/UL (ref 130–400)
POTASSIUM SERPL-SCNC: 3.9 MMOL/L (ref 3.5–5.1)
POTASSIUM SERPL-SCNC: 4 MMOL/L (ref 3.5–5.1)
PROT SERPL-MCNC: 8.9 G/DL (ref 6.3–8.2)
PROT SERPL-MCNC: 9.2 G/DL (ref 6.3–8.2)
RBC # BLD AUTO: 5.25 MILL/UL (ref 4.2–5.6)
RBC # BLD AUTO: 5.33 MILL/UL (ref 4.2–5.6)
SODIUM SERPL-SCNC: 141 MMOL/L (ref 137–146)
SODIUM SERPL-SCNC: 144 MMOL/L (ref 137–146)

## 2021-03-01 VITALS — SYSTOLIC BLOOD PRESSURE: 118 MMHG | DIASTOLIC BLOOD PRESSURE: 80 MMHG

## 2022-05-07 ENCOUNTER — HOSPITAL ENCOUNTER (EMERGENCY)
Dept: HOSPITAL 82 - ED | Age: 27
Discharge: HOME | DRG: 178 | End: 2022-05-07
Payer: COMMERCIAL

## 2022-05-07 VITALS — BODY MASS INDEX: 43.4 KG/M2 | WEIGHT: 293 LBS | HEIGHT: 69 IN

## 2022-05-07 VITALS — DIASTOLIC BLOOD PRESSURE: 62 MMHG | SYSTOLIC BLOOD PRESSURE: 115 MMHG

## 2022-05-07 VITALS — SYSTOLIC BLOOD PRESSURE: 115 MMHG | DIASTOLIC BLOOD PRESSURE: 62 MMHG

## 2022-05-07 DIAGNOSIS — E66.01: ICD-10-CM

## 2022-05-07 DIAGNOSIS — R52: ICD-10-CM

## 2022-05-07 DIAGNOSIS — R05.9: ICD-10-CM

## 2022-05-07 DIAGNOSIS — U07.1: Primary | ICD-10-CM

## 2022-05-07 DIAGNOSIS — J02.9: ICD-10-CM

## 2022-05-07 DIAGNOSIS — F31.9: ICD-10-CM

## 2022-05-07 DIAGNOSIS — N39.0: ICD-10-CM

## 2022-05-07 LAB
ALBUMIN SERPL-MCNC: 4.4 G/DL (ref 3.2–5)
ALP SERPL-CCNC: 63 U/L (ref 38–126)
ANION GAP SERPL CALCULATED.3IONS-SCNC: 13 MMOL/L
AST SERPL-CCNC: 136 U/L (ref 14–36)
BACTERIA #/AREA URNS HPF: (no result) HPF
BASOPHILS NFR BLD AUTO: 1 % (ref 0–3)
BILIRUB UR QL STRIP.AUTO: NEGATIVE
BUN SERPL-MCNC: 11 MG/DL (ref 7–17)
BUN/CREAT SERPL: 12
CHLORIDE SERPL-SCNC: 109 MMOL/L (ref 95–108)
CO2 SERPL-SCNC: 21 MMOL/L (ref 22–30)
COLOR UR AUTO: YELLOW
CREAT SERPL-MCNC: 0.9 MG/DL (ref 0.5–1)
EOSINOPHIL NFR BLD AUTO: 1 % (ref 0–8)
ERYTHROCYTE [DISTWIDTH] IN BLOOD BY AUTOMATED COUNT: 15.2 % (ref 11.5–15.5)
GLUCOSE UR STRIP.AUTO-MCNC: NEGATIVE MG/DL
HCG UR QL: NEGATIVE
HCT VFR BLD AUTO: 42.5 % (ref 37–47)
HGB BLD-MCNC: 13.7 G/DL (ref 12–16)
HGB UR QL STRIP.AUTO: (no result)
IMM GRANULOCYTES NFR BLD: 0.8 % (ref 0–5)
KETONES UR STRIP.AUTO-MCNC: NEGATIVE MG/DL
LEUKOCYTE ESTERASE UR QL STRIP.AUTO: (no result)
LYMPHOCYTES NFR BLD: 4 % (ref 15–41)
MCH RBC QN AUTO: 26.1 PG  CALC (ref 26–32)
MCHC RBC AUTO-ENTMCNC: 32.2 G/DL CAL (ref 32–36)
MCV RBC AUTO: 81 FL  CALC (ref 80–100)
MONOCYTES NFR BLD AUTO: 5 % (ref 2–13)
NEUTROPHILS # BLD AUTO: 6.16 THOU/UL (ref 2–7.15)
NEUTROPHILS NFR BLD AUTO: 87 % (ref 42–76)
NITRITE UR QL STRIP.AUTO: NEGATIVE
PH UR STRIP.AUTO: 5.5 [PH] (ref 4.5–8)
PLATELET # BLD AUTO: 182 THOU/UL (ref 130–400)
POTASSIUM SERPL-SCNC: 4.2 MMOL/L (ref 3.5–5.1)
PROT SERPL-MCNC: 8.1 G/DL (ref 6.3–8.2)
PROT UR QL STRIP.AUTO: NEGATIVE MG/DL
RBC # BLD AUTO: 5.25 MILL/UL (ref 4.2–5.6)
RBC #/AREA URNS HPF: (no result) RBC/HPF (ref 0–5)
SODIUM SERPL-SCNC: 139 MMOL/L (ref 137–146)
SP GR UR STRIP.AUTO: 1.02
SQUAMOUS URNS QL MICRO: (no result) EPI/HPF
UROBILINOGEN UR QL STRIP.AUTO: 0.2 E.U./DL
WBC #/AREA URNS HPF: (no result) WBC/HPF (ref 0–5)

## 2022-05-26 ENCOUNTER — HOSPITAL ENCOUNTER (EMERGENCY)
Dept: HOSPITAL 82 - ED | Age: 27
LOS: 1 days | Discharge: PSYCHIATRIC OR SUBSTANCE USE FACILITY | DRG: 885 | End: 2022-05-27
Payer: COMMERCIAL

## 2022-05-26 VITALS — WEIGHT: 293 LBS | HEIGHT: 69 IN | BODY MASS INDEX: 43.4 KG/M2

## 2022-05-26 VITALS — DIASTOLIC BLOOD PRESSURE: 64 MMHG | SYSTOLIC BLOOD PRESSURE: 109 MMHG

## 2022-05-26 DIAGNOSIS — E66.01: ICD-10-CM

## 2022-05-26 DIAGNOSIS — K21.9: ICD-10-CM

## 2022-05-26 DIAGNOSIS — F31.9: ICD-10-CM

## 2022-05-26 DIAGNOSIS — F19.19: ICD-10-CM

## 2022-05-26 DIAGNOSIS — F41.9: ICD-10-CM

## 2022-05-26 DIAGNOSIS — F23: Primary | ICD-10-CM

## 2022-05-26 DIAGNOSIS — Z20.822: ICD-10-CM

## 2022-05-26 DIAGNOSIS — R10.9: ICD-10-CM

## 2022-05-26 LAB
ALBUMIN SERPL-MCNC: 4.4 G/DL (ref 3.2–5)
ALP SERPL-CCNC: 77 U/L (ref 38–126)
ANION GAP SERPL CALCULATED.3IONS-SCNC: 15 MMOL/L
AST SERPL-CCNC: 57 U/L (ref 14–36)
BASOPHILS NFR BLD AUTO: 0 % (ref 0–3)
BILIRUB UR QL STRIP.AUTO: NEGATIVE
BUN SERPL-MCNC: 12 MG/DL (ref 7–17)
BUN/CREAT SERPL: 15
CHLORIDE SERPL-SCNC: 110 MMOL/L (ref 95–108)
CO2 SERPL-SCNC: 22 MMOL/L (ref 22–30)
COLOR UR AUTO: YELLOW
CREAT SERPL-MCNC: 0.8 MG/DL (ref 0.5–1)
EOSINOPHIL NFR BLD AUTO: 2 % (ref 0–8)
ERYTHROCYTE [DISTWIDTH] IN BLOOD BY AUTOMATED COUNT: 15.1 % (ref 11.5–15.5)
ETHANOL SERPL-MCNC: 0 MG/DL (ref 0–30)
GLUCOSE UR STRIP.AUTO-MCNC: NEGATIVE MG/DL
HCT VFR BLD AUTO: 42.4 % (ref 37–47)
HGB BLD-MCNC: 13.4 G/DL (ref 12–16)
HGB UR QL STRIP.AUTO: (no result)
IMM GRANULOCYTES NFR BLD: 0.6 % (ref 0–5)
KETONES UR STRIP.AUTO-MCNC: NEGATIVE MG/DL
LEUKOCYTE ESTERASE UR QL STRIP.AUTO: NEGATIVE
LYMPHOCYTES NFR BLD: 27 % (ref 15–41)
MAGNESIUM SERPL-MCNC: 2.1 MG/DL (ref 1.6–2.3)
MCH RBC QN AUTO: 25.9 PG  CALC (ref 26–32)
MCHC RBC AUTO-ENTMCNC: 31.6 G/DL CAL (ref 32–36)
MCV RBC AUTO: 82 FL  CALC (ref 80–100)
MONOCYTES NFR BLD AUTO: 8 % (ref 2–13)
NEUTROPHILS # BLD AUTO: 5.85 THOU/UL (ref 2–7.15)
NEUTROPHILS NFR BLD AUTO: 63 % (ref 42–76)
NITRITE UR QL STRIP.AUTO: NEGATIVE
PH UR STRIP.AUTO: 6 [PH] (ref 4.5–8)
PLATELET # BLD AUTO: 299 THOU/UL (ref 130–400)
POTASSIUM SERPL-SCNC: 3.9 MMOL/L (ref 3.5–5.1)
PROT SERPL-MCNC: 8.2 G/DL (ref 6.3–8.2)
PROT UR QL STRIP.AUTO: NEGATIVE MG/DL
RBC # BLD AUTO: 5.17 MILL/UL (ref 4.2–5.6)
RBC #/AREA URNS HPF: (no result) RBC/HPF (ref 0–5)
SODIUM SERPL-SCNC: 142 MMOL/L (ref 137–146)
SP GR UR STRIP.AUTO: 1.02
SQUAMOUS URNS QL MICRO: (no result) EPI/HPF
UROBILINOGEN UR QL STRIP.AUTO: 1 E.U./DL
WBC #/AREA URNS HPF: (no result) WBC/HPF (ref 0–5)

## 2022-05-27 VITALS — DIASTOLIC BLOOD PRESSURE: 64 MMHG | SYSTOLIC BLOOD PRESSURE: 109 MMHG

## 2022-06-24 ENCOUNTER — HOSPITAL ENCOUNTER (EMERGENCY)
Dept: HOSPITAL 82 - ED | Age: 27
LOS: 1 days | Discharge: HOME | DRG: 313 | End: 2022-06-25
Payer: COMMERCIAL

## 2022-06-24 ENCOUNTER — HOSPITAL ENCOUNTER (EMERGENCY)
Dept: HOSPITAL 82 - ED | Age: 27
Discharge: HOME | DRG: 552 | End: 2022-06-24
Payer: COMMERCIAL

## 2022-06-24 VITALS — SYSTOLIC BLOOD PRESSURE: 131 MMHG | DIASTOLIC BLOOD PRESSURE: 83 MMHG

## 2022-06-24 VITALS — SYSTOLIC BLOOD PRESSURE: 127 MMHG | DIASTOLIC BLOOD PRESSURE: 67 MMHG

## 2022-06-24 VITALS — SYSTOLIC BLOOD PRESSURE: 149 MMHG | DIASTOLIC BLOOD PRESSURE: 94 MMHG

## 2022-06-24 VITALS — DIASTOLIC BLOOD PRESSURE: 92 MMHG | SYSTOLIC BLOOD PRESSURE: 134 MMHG

## 2022-06-24 VITALS — SYSTOLIC BLOOD PRESSURE: 158 MMHG | DIASTOLIC BLOOD PRESSURE: 135 MMHG

## 2022-06-24 VITALS — DIASTOLIC BLOOD PRESSURE: 85 MMHG | SYSTOLIC BLOOD PRESSURE: 126 MMHG

## 2022-06-24 VITALS — DIASTOLIC BLOOD PRESSURE: 84 MMHG | SYSTOLIC BLOOD PRESSURE: 120 MMHG

## 2022-06-24 VITALS — WEIGHT: 250.45 LBS | BODY MASS INDEX: 37.09 KG/M2 | HEIGHT: 69 IN

## 2022-06-24 VITALS — DIASTOLIC BLOOD PRESSURE: 77 MMHG | SYSTOLIC BLOOD PRESSURE: 129 MMHG

## 2022-06-24 VITALS — DIASTOLIC BLOOD PRESSURE: 83 MMHG | SYSTOLIC BLOOD PRESSURE: 125 MMHG

## 2022-06-24 VITALS — BODY MASS INDEX: 45.99 KG/M2 | HEIGHT: 67 IN | WEIGHT: 293 LBS

## 2022-06-24 VITALS — DIASTOLIC BLOOD PRESSURE: 81 MMHG | SYSTOLIC BLOOD PRESSURE: 138 MMHG

## 2022-06-24 VITALS — DIASTOLIC BLOOD PRESSURE: 94 MMHG | SYSTOLIC BLOOD PRESSURE: 149 MMHG

## 2022-06-24 VITALS — SYSTOLIC BLOOD PRESSURE: 122 MMHG | DIASTOLIC BLOOD PRESSURE: 78 MMHG

## 2022-06-24 VITALS — SYSTOLIC BLOOD PRESSURE: 123 MMHG | DIASTOLIC BLOOD PRESSURE: 79 MMHG

## 2022-06-24 VITALS — DIASTOLIC BLOOD PRESSURE: 87 MMHG | SYSTOLIC BLOOD PRESSURE: 129 MMHG

## 2022-06-24 VITALS — SYSTOLIC BLOOD PRESSURE: 137 MMHG | DIASTOLIC BLOOD PRESSURE: 81 MMHG

## 2022-06-24 VITALS — DIASTOLIC BLOOD PRESSURE: 81 MMHG | SYSTOLIC BLOOD PRESSURE: 137 MMHG

## 2022-06-24 VITALS — SYSTOLIC BLOOD PRESSURE: 137 MMHG | DIASTOLIC BLOOD PRESSURE: 80 MMHG

## 2022-06-24 VITALS — SYSTOLIC BLOOD PRESSURE: 116 MMHG | DIASTOLIC BLOOD PRESSURE: 73 MMHG

## 2022-06-24 VITALS — DIASTOLIC BLOOD PRESSURE: 88 MMHG | SYSTOLIC BLOOD PRESSURE: 139 MMHG

## 2022-06-24 VITALS — DIASTOLIC BLOOD PRESSURE: 135 MMHG | SYSTOLIC BLOOD PRESSURE: 158 MMHG

## 2022-06-24 VITALS — SYSTOLIC BLOOD PRESSURE: 128 MMHG | DIASTOLIC BLOOD PRESSURE: 77 MMHG

## 2022-06-24 DIAGNOSIS — N39.0: ICD-10-CM

## 2022-06-24 DIAGNOSIS — E66.9: ICD-10-CM

## 2022-06-24 DIAGNOSIS — F41.9: ICD-10-CM

## 2022-06-24 DIAGNOSIS — R07.89: Primary | ICD-10-CM

## 2022-06-24 DIAGNOSIS — F19.10: ICD-10-CM

## 2022-06-24 DIAGNOSIS — Z87.442: ICD-10-CM

## 2022-06-24 DIAGNOSIS — I10: ICD-10-CM

## 2022-06-24 DIAGNOSIS — R07.89: ICD-10-CM

## 2022-06-24 DIAGNOSIS — M54.2: Primary | ICD-10-CM

## 2022-06-24 DIAGNOSIS — B96.20: ICD-10-CM

## 2022-06-24 DIAGNOSIS — E66.01: ICD-10-CM

## 2022-06-24 DIAGNOSIS — F31.9: ICD-10-CM

## 2022-06-24 DIAGNOSIS — B96.89: ICD-10-CM

## 2022-06-24 LAB
ALBUMIN SERPL-MCNC: 4.2 G/DL (ref 3.2–5)
ALBUMIN SERPL-MCNC: 4.3 G/DL (ref 3.2–5)
ALP SERPL-CCNC: 62 U/L (ref 38–126)
ALP SERPL-CCNC: 66 U/L (ref 38–126)
ANION GAP SERPL CALCULATED.3IONS-SCNC: 12 MMOL/L
ANION GAP SERPL CALCULATED.3IONS-SCNC: 13 MMOL/L
AST SERPL-CCNC: 22 U/L (ref 14–36)
AST SERPL-CCNC: 22 U/L (ref 14–36)
BACTERIA #/AREA URNS HPF: (no result) HPF
BASOPHILS NFR BLD AUTO: 0 % (ref 0–3)
BASOPHILS NFR BLD AUTO: 0 % (ref 0–3)
BILIRUB UR QL STRIP.AUTO: NEGATIVE
BILIRUB UR QL STRIP.AUTO: NEGATIVE
BUN SERPL-MCNC: 13 MG/DL (ref 7–17)
BUN SERPL-MCNC: 20 MG/DL (ref 7–17)
BUN/CREAT SERPL: 15
BUN/CREAT SERPL: 21
CHLORIDE SERPL-SCNC: 108 MMOL/L (ref 95–108)
CHLORIDE SERPL-SCNC: 109 MMOL/L (ref 95–108)
CO2 SERPL-SCNC: 22 MMOL/L (ref 22–30)
CO2 SERPL-SCNC: 23 MMOL/L (ref 22–30)
COLOR UR AUTO: YELLOW
COLOR UR AUTO: YELLOW
CREAT SERPL-MCNC: 0.9 MG/DL (ref 0.5–1)
CREAT SERPL-MCNC: 0.9 MG/DL (ref 0.5–1)
EOSINOPHIL NFR BLD AUTO: 1 % (ref 0–8)
EOSINOPHIL NFR BLD AUTO: 2 % (ref 0–8)
EPI CELLS URNS QL MICRO: (no result) EPI/HPF
ERYTHROCYTE [DISTWIDTH] IN BLOOD BY AUTOMATED COUNT: 15 % (ref 11.5–15.5)
ERYTHROCYTE [DISTWIDTH] IN BLOOD BY AUTOMATED COUNT: 15.1 % (ref 11.5–15.5)
GLUCOSE UR STRIP.AUTO-MCNC: NEGATIVE MG/DL
GLUCOSE UR STRIP.AUTO-MCNC: NEGATIVE MG/DL
HCT VFR BLD AUTO: 43.1 % (ref 37–47)
HCT VFR BLD AUTO: 44.6 % (ref 37–47)
HGB BLD-MCNC: 13.6 G/DL (ref 12–16)
HGB BLD-MCNC: 13.6 G/DL (ref 12–16)
HGB UR QL STRIP.AUTO: (no result)
HGB UR QL STRIP.AUTO: (no result)
IMM GRANULOCYTES NFR BLD: 1.3 % (ref 0–5)
IMM GRANULOCYTES NFR BLD: 1.4 % (ref 0–5)
KETONES UR STRIP.AUTO-MCNC: NEGATIVE MG/DL
KETONES UR STRIP.AUTO-MCNC: NEGATIVE MG/DL
LEUKOCYTE ESTERASE UR QL STRIP.AUTO: (no result)
LEUKOCYTE ESTERASE UR QL STRIP.AUTO: (no result)
LYMPHOCYTES NFR BLD: 20 % (ref 15–41)
LYMPHOCYTES NFR BLD: 25 % (ref 15–41)
MCH RBC QN AUTO: 25.5 PG  CALC (ref 26–32)
MCH RBC QN AUTO: 26.3 PG  CALC (ref 26–32)
MCHC RBC AUTO-ENTMCNC: 30.5 G/DL CAL (ref 32–36)
MCHC RBC AUTO-ENTMCNC: 31.6 G/DL CAL (ref 32–36)
MCV RBC AUTO: 83.4 FL  CALC (ref 80–100)
MCV RBC AUTO: 83.7 FL  CALC (ref 80–100)
MONOCYTES NFR BLD AUTO: 7 % (ref 2–13)
MONOCYTES NFR BLD AUTO: 8 % (ref 2–13)
MYOGLOBIN SERPL-MCNC: 60 NG/ML (ref 0–62)
MYOGLOBIN SERPL-MCNC: 67 NG/ML (ref 0–62)
NEUTROPHILS # BLD AUTO: 8.71 THOU/UL (ref 2–7.15)
NEUTROPHILS # BLD AUTO: 9.93 THOU/UL (ref 2–7.15)
NEUTROPHILS NFR BLD AUTO: 63 % (ref 42–76)
NEUTROPHILS NFR BLD AUTO: 70 % (ref 42–76)
NITRITE UR QL STRIP.AUTO: POSITIVE
NITRITE UR QL STRIP.AUTO: POSITIVE
PH UR STRIP.AUTO: 6 [PH] (ref 4.5–8)
PH UR STRIP.AUTO: 6 [PH] (ref 4.5–8)
PLATELET # BLD AUTO: 239 THOU/UL (ref 130–400)
PLATELET # BLD AUTO: 269 THOU/UL (ref 130–400)
POTASSIUM SERPL-SCNC: 3.8 MMOL/L (ref 3.5–5.1)
POTASSIUM SERPL-SCNC: 3.8 MMOL/L (ref 3.5–5.1)
PROT SERPL-MCNC: 8.5 G/DL (ref 6.3–8.2)
PROT SERPL-MCNC: 8.6 G/DL (ref 6.3–8.2)
PROT UR QL STRIP.AUTO: NEGATIVE MG/DL
PROT UR QL STRIP.AUTO: NEGATIVE MG/DL
RBC # BLD AUTO: 5.17 MILL/UL (ref 4.2–5.6)
RBC # BLD AUTO: 5.33 MILL/UL (ref 4.2–5.6)
SODIUM SERPL-SCNC: 139 MMOL/L (ref 137–146)
SODIUM SERPL-SCNC: 140 MMOL/L (ref 137–146)
SP GR UR STRIP.AUTO: 1.02
SP GR UR STRIP.AUTO: >=1.03
UROBILINOGEN UR QL STRIP.AUTO: 0.2 E.U./DL
UROBILINOGEN UR QL STRIP.AUTO: 0.2 E.U./DL

## 2022-10-13 ENCOUNTER — HOSPITAL ENCOUNTER (EMERGENCY)
Dept: HOSPITAL 82 - ED | Age: 27
Discharge: HOME | DRG: 690 | End: 2022-10-13
Payer: COMMERCIAL

## 2022-10-13 VITALS — WEIGHT: 293 LBS | BODY MASS INDEX: 45.99 KG/M2 | HEIGHT: 67 IN

## 2022-10-13 VITALS — DIASTOLIC BLOOD PRESSURE: 68 MMHG | SYSTOLIC BLOOD PRESSURE: 144 MMHG

## 2022-10-13 VITALS — DIASTOLIC BLOOD PRESSURE: 68 MMHG | SYSTOLIC BLOOD PRESSURE: 91 MMHG

## 2022-10-13 VITALS — DIASTOLIC BLOOD PRESSURE: 67 MMHG | SYSTOLIC BLOOD PRESSURE: 131 MMHG

## 2022-10-13 VITALS — DIASTOLIC BLOOD PRESSURE: 70 MMHG | SYSTOLIC BLOOD PRESSURE: 136 MMHG

## 2022-10-13 DIAGNOSIS — Z20.2: ICD-10-CM

## 2022-10-13 DIAGNOSIS — N39.0: Primary | ICD-10-CM

## 2022-10-13 LAB
BACTERIA #/AREA URNS HPF: (no result) HPF
BILIRUB UR QL STRIP.AUTO: NEGATIVE
CLARITY UR: (no result)
COLOR UR AUTO: YELLOW
EPI CELLS URNS QL MICRO: (no result) EPI/HPF
GLUCOSE UR STRIP.AUTO-MCNC: NEGATIVE MG/DL
HGB UR QL STRIP.AUTO: (no result)
KETONES UR STRIP.AUTO-MCNC: NEGATIVE MG/DL
LEUKOCYTE ESTERASE UR QL STRIP.AUTO: (no result)
NITRITE UR QL STRIP.AUTO: NEGATIVE
PH UR STRIP.AUTO: 6 [PH] (ref 4.5–8)
PROT UR STRIP.AUTO-MCNC: NEGATIVE MG/DL
RBC #/AREA URNS HPF: >100 RBC/HPF (ref 0–5)
SERVICE CMNT 15-IMP: (no result) HPF
SP GR UR STRIP.AUTO: 1.02
UROBILINOGEN UR QL STRIP.AUTO: 0.2 E.U./DL
WBC #/AREA URNS HPF: (no result) WBC/HPF (ref 0–5)
YEAST URNS QL MICRO: (no result) HPF

## 2022-11-29 ENCOUNTER — HOSPITAL ENCOUNTER (EMERGENCY)
Dept: HOSPITAL 82 - ED | Age: 27
Discharge: HOME | DRG: 313 | End: 2022-11-29
Payer: COMMERCIAL

## 2022-11-29 VITALS — DIASTOLIC BLOOD PRESSURE: 91 MMHG | SYSTOLIC BLOOD PRESSURE: 158 MMHG

## 2022-11-29 VITALS — SYSTOLIC BLOOD PRESSURE: 149 MMHG | DIASTOLIC BLOOD PRESSURE: 78 MMHG

## 2022-11-29 VITALS — SYSTOLIC BLOOD PRESSURE: 137 MMHG | DIASTOLIC BLOOD PRESSURE: 66 MMHG

## 2022-11-29 VITALS — DIASTOLIC BLOOD PRESSURE: 73 MMHG | SYSTOLIC BLOOD PRESSURE: 141 MMHG

## 2022-11-29 VITALS — DIASTOLIC BLOOD PRESSURE: 67 MMHG | SYSTOLIC BLOOD PRESSURE: 122 MMHG

## 2022-11-29 VITALS — SYSTOLIC BLOOD PRESSURE: 123 MMHG | DIASTOLIC BLOOD PRESSURE: 74 MMHG

## 2022-11-29 VITALS — SYSTOLIC BLOOD PRESSURE: 150 MMHG | DIASTOLIC BLOOD PRESSURE: 73 MMHG

## 2022-11-29 VITALS — DIASTOLIC BLOOD PRESSURE: 97 MMHG | SYSTOLIC BLOOD PRESSURE: 167 MMHG

## 2022-11-29 VITALS — SYSTOLIC BLOOD PRESSURE: 141 MMHG | DIASTOLIC BLOOD PRESSURE: 73 MMHG

## 2022-11-29 VITALS — SYSTOLIC BLOOD PRESSURE: 144 MMHG | DIASTOLIC BLOOD PRESSURE: 73 MMHG

## 2022-11-29 VITALS — DIASTOLIC BLOOD PRESSURE: 69 MMHG | SYSTOLIC BLOOD PRESSURE: 99 MMHG

## 2022-11-29 VITALS — DIASTOLIC BLOOD PRESSURE: 55 MMHG | SYSTOLIC BLOOD PRESSURE: 110 MMHG

## 2022-11-29 VITALS — WEIGHT: 293 LBS | HEIGHT: 67 IN | BODY MASS INDEX: 45.99 KG/M2

## 2022-11-29 DIAGNOSIS — F31.9: ICD-10-CM

## 2022-11-29 DIAGNOSIS — N39.0: ICD-10-CM

## 2022-11-29 DIAGNOSIS — R07.89: Primary | ICD-10-CM

## 2022-11-29 DIAGNOSIS — Z20.2: ICD-10-CM

## 2022-11-29 DIAGNOSIS — G47.30: ICD-10-CM

## 2022-11-29 LAB
ALBUMIN SERPL-MCNC: 4.2 G/DL (ref 3.2–5)
ALP SERPL-CCNC: 76 U/L (ref 38–126)
ANION GAP SERPL CALCULATED.3IONS-SCNC: 14 MMOL/L
AST SERPL-CCNC: 29 U/L (ref 14–36)
BACTERIA #/AREA URNS HPF: (no result) HPF
BASOPHILS NFR BLD AUTO: 0 % (ref 0–3)
BILIRUB UR QL STRIP.AUTO: NEGATIVE
BUN SERPL-MCNC: 12 MG/DL (ref 7–17)
BUN/CREAT SERPL: 16
CHLORIDE SERPL-SCNC: 108 MMOL/L (ref 95–108)
CO2 SERPL-SCNC: 20 MMOL/L (ref 22–30)
COLOR UR AUTO: YELLOW
CREAT SERPL-MCNC: 0.8 MG/DL (ref 0.5–1)
EOSINOPHIL NFR BLD AUTO: 1 % (ref 0–8)
EPI CELLS URNS QL MICRO: (no result) EPI/HPF
ERYTHROCYTE [DISTWIDTH] IN BLOOD BY AUTOMATED COUNT: 14.7 % (ref 11.5–15.5)
GLUCOSE UR STRIP.AUTO-MCNC: NEGATIVE MG/DL
HCT VFR BLD AUTO: 39.7 % (ref 37–47)
HGB BLD-MCNC: 12.9 G/DL (ref 12–16)
HGB UR QL STRIP.AUTO: (no result)
IMM GRANULOCYTES NFR BLD: 2.6 % (ref 0–5)
KETONES UR STRIP.AUTO-MCNC: (no result) MG/DL
LEUKOCYTE ESTERASE UR QL STRIP.AUTO: (no result)
LYMPHOCYTES NFR BLD: 20 % (ref 15–41)
MCH RBC QN AUTO: 25.8 PG  CALC (ref 26–32)
MCHC RBC AUTO-ENTMCNC: 32.5 G/DL CAL (ref 32–36)
MCV RBC AUTO: 79.4 FL  CALC (ref 80–100)
MONOCYTES NFR BLD AUTO: 6 % (ref 2–13)
MYOGLOBIN SERPL-MCNC: 39 NG/ML (ref 0–62)
NEUTROPHILS # BLD AUTO: 11.88 THOU/UL (ref 2–7.15)
NEUTROPHILS NFR BLD AUTO: 70 % (ref 42–76)
NITRITE UR QL STRIP.AUTO: NEGATIVE
PH UR STRIP.AUTO: 7.5 [PH] (ref 4.5–8)
PLATELET # BLD AUTO: 282 THOU/UL (ref 130–400)
POTASSIUM SERPL-SCNC: 4 MMOL/L (ref 3.5–5.1)
PROT SERPL-MCNC: 7.8 G/DL (ref 6.3–8.2)
PROT UR QL STRIP.AUTO: NEGATIVE MG/DL
RBC # BLD AUTO: 5 MILL/UL (ref 4.2–5.6)
RBC #/AREA URNS HPF: (no result) RBC/HPF (ref 0–5)
SODIUM SERPL-SCNC: 139 MMOL/L (ref 137–146)
SP GR UR STRIP.AUTO: 1.01
UROBILINOGEN UR QL STRIP.AUTO: 0.2 E.U./DL

## 2022-12-12 ENCOUNTER — HOSPITAL ENCOUNTER (EMERGENCY)
Dept: HOSPITAL 82 - ED | Age: 27
Discharge: HOME | DRG: 690 | End: 2022-12-12
Payer: COMMERCIAL

## 2022-12-12 VITALS — HEIGHT: 67 IN | WEIGHT: 293 LBS | BODY MASS INDEX: 45.99 KG/M2

## 2022-12-12 VITALS — DIASTOLIC BLOOD PRESSURE: 74 MMHG | SYSTOLIC BLOOD PRESSURE: 128 MMHG

## 2022-12-12 VITALS — DIASTOLIC BLOOD PRESSURE: 81 MMHG | SYSTOLIC BLOOD PRESSURE: 135 MMHG

## 2022-12-12 DIAGNOSIS — N39.0: Primary | ICD-10-CM

## 2022-12-12 DIAGNOSIS — M54.2: ICD-10-CM

## 2022-12-12 DIAGNOSIS — Z20.822: ICD-10-CM

## 2022-12-12 LAB
ALBUMIN SERPL-MCNC: 4.3 G/DL (ref 3.2–5)
ALP SERPL-CCNC: 75 U/L (ref 38–126)
ANION GAP SERPL CALCULATED.3IONS-SCNC: 14 MMOL/L
AST SERPL-CCNC: 102 U/L (ref 14–36)
BACTERIA #/AREA URNS HPF: (no result) HPF
BASOPHILS NFR BLD AUTO: 0 % (ref 0–3)
BILIRUB UR QL STRIP.AUTO: NEGATIVE
BUN SERPL-MCNC: 14 MG/DL (ref 7–17)
BUN/CREAT SERPL: 20
CHLORIDE SERPL-SCNC: 104 MMOL/L (ref 95–108)
CO2 SERPL-SCNC: 23 MMOL/L (ref 22–30)
COLOR UR AUTO: YELLOW
CREAT SERPL-MCNC: 0.7 MG/DL (ref 0.5–1)
EOSINOPHIL NFR BLD AUTO: 2 % (ref 0–8)
ERYTHROCYTE [DISTWIDTH] IN BLOOD BY AUTOMATED COUNT: 14.5 % (ref 11.5–15.5)
GLUCOSE UR STRIP.AUTO-MCNC: NEGATIVE MG/DL
HCT VFR BLD AUTO: 44.1 % (ref 37–47)
HGB BLD-MCNC: 14.4 G/DL (ref 12–16)
HGB UR QL STRIP.AUTO: (no result)
IMM GRANULOCYTES NFR BLD: 1.1 % (ref 0–5)
KETONES UR STRIP.AUTO-MCNC: NEGATIVE MG/DL
LEUKOCYTE ESTERASE UR QL STRIP.AUTO: (no result)
LIPASE SERPL-CCNC: 92 U/L (ref 23–300)
LYMPHOCYTES NFR BLD: 19 % (ref 15–41)
MCH RBC QN AUTO: 25.8 PG  CALC (ref 26–32)
MCHC RBC AUTO-ENTMCNC: 32.7 G/DL CAL (ref 32–36)
MCV RBC AUTO: 79 FL  CALC (ref 80–100)
MONOCYTES NFR BLD AUTO: 6 % (ref 2–13)
NEUTROPHILS # BLD AUTO: 8.81 THOU/UL (ref 2–7.15)
NEUTROPHILS NFR BLD AUTO: 72 % (ref 42–76)
NITRITE UR QL STRIP.AUTO: NEGATIVE
PH UR STRIP.AUTO: 5.5 [PH] (ref 4.5–8)
PLATELET # BLD AUTO: 280 THOU/UL (ref 130–400)
POTASSIUM SERPL-SCNC: 4.3 MMOL/L (ref 3.5–5.1)
PROT SERPL-MCNC: 8.4 G/DL (ref 6.3–8.2)
PROT UR QL STRIP.AUTO: NEGATIVE MG/DL
RBC # BLD AUTO: 5.58 MILL/UL (ref 4.2–5.6)
RBC #/AREA URNS HPF: (no result) RBC/HPF (ref 0–5)
SODIUM SERPL-SCNC: 137 MMOL/L (ref 137–146)
SP GR UR STRIP.AUTO: 1.02
SQUAMOUS URNS QL MICRO: (no result) EPI/HPF
UROBILINOGEN UR QL STRIP.AUTO: 0.2 E.U./DL
WBC #/AREA URNS HPF: (no result) WBC/HPF (ref 0–5)

## 2022-12-31 ENCOUNTER — HOSPITAL ENCOUNTER (EMERGENCY)
Dept: HOSPITAL 82 - ED | Age: 27
Discharge: HOME | DRG: 918 | End: 2022-12-31
Payer: COMMERCIAL

## 2022-12-31 VITALS — HEIGHT: 67 IN | WEIGHT: 293 LBS | BODY MASS INDEX: 45.99 KG/M2

## 2022-12-31 VITALS — SYSTOLIC BLOOD PRESSURE: 114 MMHG | DIASTOLIC BLOOD PRESSURE: 74 MMHG

## 2022-12-31 DIAGNOSIS — T43.651A: Primary | ICD-10-CM

## 2022-12-31 LAB
ALBUMIN SERPL-MCNC: 4.4 G/DL (ref 3.2–5)
ALP SERPL-CCNC: 76 U/L (ref 38–126)
ANION GAP SERPL CALCULATED.3IONS-SCNC: 11 MMOL/L
AST SERPL-CCNC: 45 U/L (ref 14–36)
BACTERIA #/AREA URNS HPF: (no result) HPF
BASOPHILS NFR BLD AUTO: 0.2 % (ref 0–3)
BILIRUB UR QL STRIP.AUTO: NEGATIVE
BUN SERPL-MCNC: 11 MG/DL (ref 7–17)
BUN/CREAT SERPL: 17
CHLORIDE SERPL-SCNC: 108 MMOL/L (ref 95–108)
CO2 SERPL-SCNC: 23 MMOL/L (ref 22–30)
COLOR UR AUTO: (no result)
CREAT SERPL-MCNC: 0.7 MG/DL (ref 0.5–1)
EOSINOPHIL NFR BLD AUTO: 0.5 % (ref 0–8)
ERYTHROCYTE [DISTWIDTH] IN BLOOD BY AUTOMATED COUNT: 15.2 % (ref 11.5–15.5)
ETHANOL SERPL-MCNC: 0 MG/DL (ref 0–30)
GLUCOSE UR STRIP.AUTO-MCNC: NEGATIVE MG/DL
HCT VFR BLD AUTO: 40.8 % (ref 37–47)
HGB BLD-MCNC: 13.5 G/DL (ref 12–16)
HGB UR QL STRIP.AUTO: (no result)
IMM GRANULOCYTES NFR BLD: 0.7 % (ref 0–5)
KETONES UR STRIP.AUTO-MCNC: NEGATIVE MG/DL
LEUKOCYTE ESTERASE UR QL STRIP.AUTO: NEGATIVE
LYMPHOCYTES NFR BLD: 22.5 % (ref 15–41)
MAGNESIUM SERPL-MCNC: 1.9 MG/DL (ref 1.6–2.3)
MCH RBC QN AUTO: 26.4 PG  CALC (ref 26–32)
MCHC RBC AUTO-ENTMCNC: 33.1 G/DL CAL (ref 32–36)
MCV RBC AUTO: 79.8 FL  CALC (ref 80–100)
MONOCYTES NFR BLD AUTO: 7 % (ref 2–13)
NEUTROPHILS # BLD AUTO: 8.47 THOU/UL (ref 2–7.15)
NEUTROPHILS NFR BLD AUTO: 69.1 % (ref 42–76)
NITRITE UR QL STRIP.AUTO: NEGATIVE
PH UR STRIP.AUTO: 6.5 [PH] (ref 4.5–8)
PLATELET # BLD AUTO: 280 THOU/UL (ref 130–400)
POTASSIUM SERPL-SCNC: 3.9 MMOL/L (ref 3.5–5.1)
PROT SERPL-MCNC: 8.3 G/DL (ref 6.3–8.2)
PROT UR QL STRIP.AUTO: NEGATIVE MG/DL
RBC # BLD AUTO: 5.11 MILL/UL (ref 4.2–5.6)
RBC #/AREA URNS HPF: >100 RBC/HPF (ref 0–5)
SODIUM SERPL-SCNC: 139 MMOL/L (ref 137–146)
SP GR UR STRIP.AUTO: 1.02
SQUAMOUS URNS QL MICRO: (no result) EPI/HPF
UROBILINOGEN UR QL STRIP.AUTO: 0.2 E.U./DL
WBC #/AREA URNS HPF: (no result) WBC/HPF (ref 0–5)

## 2023-04-07 ENCOUNTER — HOSPITAL ENCOUNTER (EMERGENCY)
Dept: HOSPITAL 82 - ED | Age: 28
Discharge: HOME | DRG: 897 | End: 2023-04-07
Payer: COMMERCIAL

## 2023-04-07 VITALS — DIASTOLIC BLOOD PRESSURE: 83 MMHG | SYSTOLIC BLOOD PRESSURE: 134 MMHG

## 2023-04-07 VITALS — BODY MASS INDEX: 45.99 KG/M2 | HEIGHT: 67 IN | WEIGHT: 293 LBS

## 2023-04-07 DIAGNOSIS — F19.120: Primary | ICD-10-CM

## 2023-04-07 LAB
ALBUMIN SERPL-MCNC: 4.9 G/DL (ref 3.2–5)
ALP SERPL-CCNC: 79 U/L (ref 38–126)
ANION GAP SERPL CALCULATED.3IONS-SCNC: 19 MMOL/L
AST SERPL-CCNC: 33 U/L (ref 14–36)
BASOPHILS NFR BLD AUTO: 0.2 % (ref 0–3)
BILIRUB UR QL STRIP.AUTO: (no result)
BUN SERPL-MCNC: 14 MG/DL (ref 7–17)
BUN/CREAT SERPL: 16
CHLORIDE SERPL-SCNC: 106 MMOL/L (ref 95–108)
CO2 SERPL-SCNC: 22 MMOL/L (ref 22–30)
COLOR UR AUTO: YELLOW
CREAT SERPL-MCNC: 0.9 MG/DL (ref 0.5–1)
EOSINOPHIL NFR BLD AUTO: 0.4 % (ref 0–8)
ERYTHROCYTE [DISTWIDTH] IN BLOOD BY AUTOMATED COUNT: 14.2 % (ref 11.5–15.5)
ETHANOL SERPL-MCNC: 0 MG/DL (ref 0–30)
GLUCOSE UR STRIP.AUTO-MCNC: NEGATIVE MG/DL
HCT VFR BLD AUTO: 46.5 % (ref 37–47)
HGB BLD-MCNC: 14.8 G/DL (ref 12–16)
HGB UR QL STRIP.AUTO: (no result)
IMM GRANULOCYTES NFR BLD: 0.9 % (ref 0–5)
KETONES UR STRIP.AUTO-MCNC: 15 MG/DL
LEUKOCYTE ESTERASE UR QL STRIP.AUTO: NEGATIVE
LYMPHOCYTES NFR BLD: 21 % (ref 15–41)
MAGNESIUM SERPL-MCNC: 2 MG/DL (ref 1.6–2.3)
MCH RBC QN AUTO: 25.2 PG  CALC (ref 26–32)
MCHC RBC AUTO-ENTMCNC: 31.8 G/DL CAL (ref 32–36)
MCV RBC AUTO: 79.1 FL  CALC (ref 80–100)
MONOCYTES NFR BLD AUTO: 7.2 % (ref 2–13)
NEUTROPHILS # BLD AUTO: 10.37 THOU/UL (ref 2–7.15)
NEUTROPHILS NFR BLD AUTO: 70.3 % (ref 42–76)
NITRITE UR QL STRIP.AUTO: NEGATIVE
PH UR STRIP.AUTO: 5.5 [PH] (ref 4.5–8)
PLATELET # BLD AUTO: 307 THOU/UL (ref 130–400)
POTASSIUM SERPL-SCNC: 3.9 MMOL/L (ref 3.5–5.1)
PROT SERPL-MCNC: 9.2 G/DL (ref 6.3–8.2)
PROT UR QL STRIP.AUTO: 100 MG/DL
RBC # BLD AUTO: 5.88 MILL/UL (ref 4.2–5.6)
RBC #/AREA URNS HPF: (no result) RBC/HPF (ref 0–5)
SODIUM SERPL-SCNC: 143 MMOL/L (ref 137–146)
SP GR UR STRIP.AUTO: >=1.03
SQUAMOUS URNS QL MICRO: (no result) EPI/HPF
UROBILINOGEN UR QL STRIP.AUTO: 0.2 E.U./DL
WBC #/AREA URNS HPF: (no result) WBC/HPF (ref 0–5)

## 2023-07-26 ENCOUNTER — HOSPITAL ENCOUNTER (EMERGENCY)
Dept: HOSPITAL 82 - ED | Age: 28
Discharge: HOME | DRG: 156 | End: 2023-07-26
Payer: SELF-PAY

## 2023-07-26 VITALS — HEIGHT: 67 IN | BODY MASS INDEX: 45.99 KG/M2 | WEIGHT: 293 LBS

## 2023-07-26 VITALS — SYSTOLIC BLOOD PRESSURE: 134 MMHG | DIASTOLIC BLOOD PRESSURE: 77 MMHG

## 2023-07-26 DIAGNOSIS — F31.9: ICD-10-CM

## 2023-07-26 DIAGNOSIS — H60.92: Primary | ICD-10-CM

## 2023-07-26 DIAGNOSIS — F41.9: ICD-10-CM

## 2023-07-26 DIAGNOSIS — E66.9: ICD-10-CM

## 2025-01-21 ENCOUNTER — HOSPITAL ENCOUNTER (EMERGENCY)
Dept: HOSPITAL 82 - ED | Age: 30
Discharge: HOME | DRG: 690 | End: 2025-01-21
Payer: COMMERCIAL

## 2025-01-21 VITALS — SYSTOLIC BLOOD PRESSURE: 130 MMHG | DIASTOLIC BLOOD PRESSURE: 75 MMHG

## 2025-01-21 VITALS — DIASTOLIC BLOOD PRESSURE: 73 MMHG | SYSTOLIC BLOOD PRESSURE: 116 MMHG

## 2025-01-21 VITALS — SYSTOLIC BLOOD PRESSURE: 116 MMHG | DIASTOLIC BLOOD PRESSURE: 73 MMHG

## 2025-01-21 VITALS — DIASTOLIC BLOOD PRESSURE: 73 MMHG | SYSTOLIC BLOOD PRESSURE: 122 MMHG

## 2025-01-21 VITALS — HEIGHT: 67 IN | BODY MASS INDEX: 45.99 KG/M2 | WEIGHT: 293 LBS

## 2025-01-21 VITALS — DIASTOLIC BLOOD PRESSURE: 76 MMHG | SYSTOLIC BLOOD PRESSURE: 121 MMHG

## 2025-01-21 DIAGNOSIS — Z87.442: ICD-10-CM

## 2025-01-21 DIAGNOSIS — F17.200: ICD-10-CM

## 2025-01-21 DIAGNOSIS — F31.9: ICD-10-CM

## 2025-01-21 DIAGNOSIS — N39.0: Primary | ICD-10-CM

## 2025-01-21 DIAGNOSIS — F41.9: ICD-10-CM

## 2025-01-21 DIAGNOSIS — E66.9: ICD-10-CM

## 2025-01-21 LAB
ANION GAP SERPL CALCULATED.3IONS-SCNC: 11 MMOL/L
BACTERIA #/AREA URNS HPF: (no result) HPF
BASOPHILS NFR BLD AUTO: 0.2 % (ref 0–3)
BUN SERPL-MCNC: 10 MG/DL (ref 7–17)
BUN/CREAT SERPL: 14
CHLORIDE SERPL-SCNC: 107 MMOL/L (ref 95–108)
CO2 SERPL-SCNC: 25 MMOL/L (ref 22–30)
CREAT SERPL-MCNC: 0.7 MG/DL (ref 0.5–1)
EOSINOPHIL NFR BLD AUTO: 0.9 % (ref 0–8)
ERYTHROCYTE [DISTWIDTH] IN BLOOD BY AUTOMATED COUNT: 13.3 % (ref 11.5–15.5)
HCT VFR BLD AUTO: 41.5 % (ref 37–47)
HGB BLD-MCNC: 12.7 G/DL (ref 12–16)
IMM GRANULOCYTES NFR BLD: 0.3 % (ref 0–5)
LYMPHOCYTES NFR BLD: 25.8 % (ref 15–41)
MCH RBC QN AUTO: 26.2 PG  CALC (ref 26–32)
MCHC RBC AUTO-ENTMCNC: 30.6 G/DL CAL (ref 32–36)
MCV RBC AUTO: 85.7 FL  CALC (ref 80–100)
MONOCYTES NFR BLD AUTO: 9.4 % (ref 2–13)
NEUTROPHILS # BLD AUTO: 6.08 THOU/UL (ref 2–7.15)
NEUTROPHILS NFR BLD AUTO: 63.4 % (ref 42–76)
PH UR STRIP.AUTO: 6 [PH] (ref 4.5–8)
PLATELET # BLD AUTO: 254 THOU/UL (ref 130–400)
POTASSIUM SERPL-SCNC: 3.8 MMOL/L (ref 3.5–5.1)
PROT UR QL STRIP.AUTO: 30 MG/DL
RBC # BLD AUTO: 4.84 MILL/UL (ref 4.2–5.6)
RBC #/AREA URNS HPF: (no result) RBC/HPF (ref 0–5)
SODIUM SERPL-SCNC: 140 MMOL/L (ref 137–146)
SP GR UR STRIP.AUTO: 1.02
SQUAMOUS URNS QL MICRO: (no result) EPI/HPF
UROBILINOGEN UR QL STRIP.AUTO: 1 E.U./DL
WBC #/AREA URNS HPF: (no result) WBC/HPF (ref 0–5)